# Patient Record
Sex: FEMALE | Race: WHITE | NOT HISPANIC OR LATINO | Employment: OTHER | URBAN - NONMETROPOLITAN AREA
[De-identification: names, ages, dates, MRNs, and addresses within clinical notes are randomized per-mention and may not be internally consistent; named-entity substitution may affect disease eponyms.]

---

## 2021-06-09 ENCOUNTER — TELEPHONE (OUTPATIENT)
Dept: CARDIOLOGY | Facility: CLINIC | Age: 86
End: 2021-06-09

## 2021-06-09 NOTE — TELEPHONE ENCOUNTER
Received cardiac clearance from [facility].    Patient is scheduled for new pt appointment on 07/01/21. Will address following.

## 2021-06-14 NOTE — TELEPHONE ENCOUNTER
Jay Jay wen/ Dr. Crespo's office asked if we received clearance, I explained that pt is scheduled for appt on 7/1.

## 2021-07-01 ENCOUNTER — OFFICE VISIT (OUTPATIENT)
Dept: CARDIOLOGY | Facility: CLINIC | Age: 86
End: 2021-07-01

## 2021-07-01 VITALS
WEIGHT: 161.2 LBS | SYSTOLIC BLOOD PRESSURE: 119 MMHG | OXYGEN SATURATION: 94 % | BODY MASS INDEX: 28.56 KG/M2 | HEART RATE: 79 BPM | HEIGHT: 63 IN | DIASTOLIC BLOOD PRESSURE: 62 MMHG

## 2021-07-01 DIAGNOSIS — I25.10 CORONARY ARTERY DISEASE INVOLVING NATIVE CORONARY ARTERY OF NATIVE HEART, ANGINA PRESENCE UNSPECIFIED: ICD-10-CM

## 2021-07-01 DIAGNOSIS — Z01.810 PREOP CARDIOVASCULAR EXAM: ICD-10-CM

## 2021-07-01 DIAGNOSIS — R06.02 SOB (SHORTNESS OF BREATH): Primary | ICD-10-CM

## 2021-07-01 DIAGNOSIS — R42 DIZZINESS: ICD-10-CM

## 2021-07-01 DIAGNOSIS — R00.2 PALPITATIONS: ICD-10-CM

## 2021-07-01 PROCEDURE — 93000 ELECTROCARDIOGRAM COMPLETE: CPT | Performed by: PHYSICIAN ASSISTANT

## 2021-07-01 PROCEDURE — 99204 OFFICE O/P NEW MOD 45 MIN: CPT | Performed by: PHYSICIAN ASSISTANT

## 2021-07-01 RX ORDER — ALLOPURINOL 300 MG/1
300 TABLET ORAL DAILY
COMMUNITY

## 2021-07-01 RX ORDER — ASPIRIN 81 MG/1
81 TABLET ORAL DAILY
COMMUNITY

## 2021-07-01 RX ORDER — CARVEDILOL 12.5 MG/1
12.5 TABLET ORAL 2 TIMES DAILY WITH MEALS
COMMUNITY
End: 2022-10-27

## 2021-07-01 RX ORDER — TRAMADOL HYDROCHLORIDE 50 MG/1
50 TABLET ORAL EVERY 6 HOURS PRN
COMMUNITY
End: 2022-10-27

## 2021-07-01 NOTE — PATIENT INSTRUCTIONS

## 2021-07-01 NOTE — PROGRESS NOTES
Subjective   Rukhsana Guerrero is a 87 y.o. female     Chief Complaint   Patient presents with   • [facility]     CC for TKA    Problem list:  1.  Coronary artery disease.  1.1.  Multivessel coronary artery disease per catheterization in 2006.  No targets for intervention was noted at that time and medical management was recommended.  She has been treated medically since.  2.  Reported history of anterior wall MI.  3.  History of ischemic cardiomyopathy, estimated EF at 40% by previous work-up.  4.  Moderate mitral regurgitation.  5.  Hypertension  6.  Dyslipidemia    HPI  The patient presents into the clinic today to reestablish care.  She was last seen in 2014.  She has been followed from cardiovascular standpoint given history as above.  By previous catheterization, recommendation was made to treat her medically as there is no target for intervention at that time.  Currently, the patient feels that she is doing reasonably well clinically.  She does have dyspnea which can limit activity.  Most of her activity however is limited secondary to degenerative joint disease.  She denies chest pain.  She has no failure symptoms noted.  She has rare palpitations but no sustained dysrhythmic activity.  She has stable lower extremity edema.  She was referred back to the clinic today by her primary care provider, Dr. Josue.  She was advised that she would need cardiac clearance prior to knee replacement.  The patient has no further complaints otherwise at this time.      Current Outpatient Medications   Medication Sig Dispense Refill   • allopurinol (ZYLOPRIM) 300 MG tablet Take 300 mg by mouth Daily.     • aspirin 81 MG EC tablet Take 81 mg by mouth Daily.     • carvedilol (COREG) 12.5 MG tablet Take 12.5 mg by mouth 2 (Two) Times a Day With Meals.     • traMADol (ULTRAM) 50 MG tablet Take 50 mg by mouth Every 6 (Six) Hours As Needed for Moderate Pain .       No current facility-administered medications for this visit.  "      Codeine    Past Medical History:   Diagnosis Date   • COVID-19    • COVID-19 vaccine administered    • Gout    • Hypertension    • Pneumonia        Social History     Socioeconomic History   • Marital status:      Spouse name: Not on file   • Number of children: Not on file   • Years of education: Not on file   • Highest education level: Not on file   Tobacco Use   • Smoking status: Never Smoker   • Smokeless tobacco: Never Used   Substance and Sexual Activity   • Alcohol use: Never   • Drug use: Never   • Sexual activity: Defer       Family History   Problem Relation Age of Onset   • Pancreatitis Mother    • Heart disease Father    • Heart attack Father        Review of Systems   Constitutional: Negative.  Negative for chills, fatigue and fever.   HENT: Positive for rhinorrhea. Negative for congestion and sore throat.    Eyes: Positive for visual disturbance (glasses).   Respiratory: Negative.  Negative for chest tightness, shortness of breath and wheezing.    Cardiovascular: Positive for leg swelling. Negative for chest pain and palpitations.   Gastrointestinal: Negative.    Endocrine: Negative.    Genitourinary: Negative.    Musculoskeletal: Positive for arthralgias. Negative for back pain and neck pain.   Skin: Negative.  Negative for rash and wound.   Allergic/Immunologic: Positive for environmental allergies.   Neurological: Positive for numbness (arms ). Negative for dizziness, weakness and headaches.   Hematological: Bruises/bleeds easily (bruises/ bleeds).   Psychiatric/Behavioral: Negative.  Negative for sleep disturbance.       Objective     Vitals:    07/01/21 1049   BP: 119/62   BP Location: Left arm   Patient Position: Sitting   Pulse: 79   SpO2: 94%   Weight: 73.1 kg (161 lb 3.2 oz)   Height: 160 cm (63\")        /62 (BP Location: Left arm, Patient Position: Sitting)   Pulse 79   Ht 160 cm (63\")   Wt 73.1 kg (161 lb 3.2 oz)   SpO2 94%   BMI 28.56 kg/m²      Lab Results (most " recent)     None          Physical Exam  Vitals and nursing note reviewed.   Constitutional:       General: She is not in acute distress.     Appearance: She is well-developed.   HENT:      Head: Normocephalic and atraumatic.   Eyes:      Conjunctiva/sclera: Conjunctivae normal.      Pupils: Pupils are equal, round, and reactive to light.   Neck:      Vascular: No JVD.      Trachea: No tracheal deviation.   Cardiovascular:      Rate and Rhythm: Normal rate and regular rhythm.      Heart sounds: Murmur heard.   Systolic (LSB) murmur is present with a grade of 1/6.     Pulmonary:      Effort: Pulmonary effort is normal.      Breath sounds: Normal breath sounds.   Abdominal:      General: Bowel sounds are normal. There is no distension.      Palpations: Abdomen is soft. There is no mass.      Tenderness: There is no abdominal tenderness. There is no guarding or rebound.   Musculoskeletal:         General: No tenderness or deformity. Normal range of motion.      Cervical back: Normal range of motion and neck supple.      Right lower le+ Edema present.      Left lower le+ Edema present.   Skin:     General: Skin is warm and dry.      Coloration: Skin is not pale.      Findings: No erythema or rash.   Neurological:      Mental Status: She is alert and oriented to person, place, and time.   Psychiatric:         Behavior: Behavior normal.         Thought Content: Thought content normal.         Judgment: Judgment normal.         Procedure     ECG 12 Lead    Date/Time: 2021 10:56 AM  Performed by: Everett Salas PA  Authorized by: Everett Salas PA   Comparison: compared with previous ECG from 2020  Comparison to previous ECG: Sinus rhythm, right bundle branch block morphology, left anterior fascicular block, overall low voltage, no acute changes noted.                   Assessment/Plan      Diagnosis Plan   1. SOB (shortness of breath)  Adult Transthoracic Echo Complete W/ Cont if Necessary Per  Protocol    US Carotid Bilateral    Stress Test With Myocardial Perfusion   2. Palpitations  Adult Transthoracic Echo Complete W/ Cont if Necessary Per Protocol    Stress Test With Myocardial Perfusion   3. Dizziness  US Carotid Bilateral    Stress Test With Myocardial Perfusion   4. Coronary artery disease involving native coronary artery of native heart, angina presence unspecified     5. Preop cardiovascular exam       1.  The patient presents today for cardiac clearance prior to knee replacement.  Given cardiovascular history, all as outlined above, I feel that the patient needs further testing prior to surgical clearance.    2.  She will need ischemia assessment given known coronary disease.  She cannot complete treadmill protocol and will be scheduled for Lexiscan nuclear stress testing.  She needs restratification in the preop setting.    3.  With history of ischemic cardiomyopathy and moderate mitral regurgitation by her previous work-up, remote, the patient will be scheduled for an echocardiogram.  We can evaluate LV size and function, valvular morphologies, and cardiac structure/parameters otherwise.    4.  I would like to schedule the patient for carotid duplex given symptoms as above and clinical scenario otherwise.    5.  I would make no adjustments in medications at this time.  She would need consideration for statin therapy.  We can discuss this with her at follow-up.  We will see her in follow-up of the above studies and recommend her further at that time.          Advance Care Planning   ACP discussion was held with the patient during this visit. Patient does not have an advance directive, declines further assistance.    Electronically signed by:

## 2021-07-09 ENCOUNTER — OUTSIDE FACILITY SERVICE (OUTPATIENT)
Dept: CARDIOLOGY | Facility: CLINIC | Age: 86
End: 2021-07-09

## 2021-07-09 PROCEDURE — 93018 CV STRESS TEST I&R ONLY: CPT | Performed by: INTERNAL MEDICINE

## 2021-07-09 PROCEDURE — 93306 TTE W/DOPPLER COMPLETE: CPT | Performed by: INTERNAL MEDICINE

## 2021-07-12 ENCOUNTER — OFFICE VISIT (OUTPATIENT)
Dept: CARDIOLOGY | Facility: CLINIC | Age: 86
End: 2021-07-12

## 2021-07-12 VITALS
DIASTOLIC BLOOD PRESSURE: 84 MMHG | HEIGHT: 63 IN | HEART RATE: 97 BPM | WEIGHT: 159 LBS | BODY MASS INDEX: 28.17 KG/M2 | SYSTOLIC BLOOD PRESSURE: 152 MMHG | OXYGEN SATURATION: 95 %

## 2021-07-12 DIAGNOSIS — R06.02 SHORTNESS OF BREATH: ICD-10-CM

## 2021-07-12 DIAGNOSIS — E78.2 MIXED HYPERLIPIDEMIA: ICD-10-CM

## 2021-07-12 DIAGNOSIS — I10 ESSENTIAL HYPERTENSION: ICD-10-CM

## 2021-07-12 DIAGNOSIS — I25.5 ISCHEMIC CARDIOMYOPATHY: ICD-10-CM

## 2021-07-12 DIAGNOSIS — I25.84 CORONARY ARTERY DISEASE DUE TO CALCIFIED CORONARY LESION: Primary | ICD-10-CM

## 2021-07-12 DIAGNOSIS — I25.10 CORONARY ARTERY DISEASE DUE TO CALCIFIED CORONARY LESION: Primary | ICD-10-CM

## 2021-07-12 PROCEDURE — 99213 OFFICE O/P EST LOW 20 MIN: CPT | Performed by: INTERNAL MEDICINE

## 2021-07-12 NOTE — PROGRESS NOTES
Subjective   Rukhsana Guerrero is a 87 y.o. female     Chief Complaint   Patient presents with   • Follow-up     Here for abnl. stress test   • Coronary Artery Disease   • Hyperlipidemia   • Hypertension   • Shortness of Breath       PROBLEM LIST:     1.  Coronary artery disease.  1.1.  Multivessel coronary artery disease per catheterization in 2006.  No targets for intervention was noted at that time and medical management was recommended.  She has been treated medically since.  1.2 Stress test, 7-9-2021, large apical infarct. Mild saravanan-infarct ischemia to inferior wall, EF 35%  2.  Reported history of anterior wall MI.  3.  History of ischemic cardiomyopathy, estimated EF at 40% by previous work-up.  3.1 Echo, 7-9-2021, EF 35%, DD, mild-mod. PHTN, mod. MR, Mod. TR, pulm. Pressures 55 mmHg  4.  Moderate mitral regurgitation.  5.  Hypertension  6.  Dyslipidemia    Specialty Problems     None            HPI:  Ms. Guerrero returns for follow-up on testing.    She continues to deny chest pain, orthopnea, PND, or change in mild lower extremity edema.  She has no palpitations, dizziness, presyncope, or syncope.  She denies any symptoms of TIA or stroke.    Bilateral carotid duplex study demonstrated no evidence of obstructive disease with antegrade flow in both vertebral arteries.  Echocardiogram demonstrated LV dilation with a global ejection fraction estimated at 35% there was evidence of anterior wall MI and diastolic dysfunction.  I cannot determine the degree of diastolic dysfunction from the printed report.    Stress test demonstrated a large apical infarct with mild saravanan-infarct ischemia limited to the inferoapical segment.  Global ejection fraction was 35%.  PA systolic pressures were estimated in the mid 50s.                    PRIOR MEDICATIONS    Current Outpatient Medications on File Prior to Visit   Medication Sig Dispense Refill   • allopurinol (ZYLOPRIM) 300 MG tablet Take 300 mg by mouth Daily.     • aspirin 81  MG EC tablet Take 81 mg by mouth Daily.     • carvedilol (COREG) 12.5 MG tablet Take 12.5 mg by mouth 2 (Two) Times a Day With Meals.     • traMADol (ULTRAM) 50 MG tablet Take 50 mg by mouth Every 6 (Six) Hours As Needed for Moderate Pain .       No current facility-administered medications on file prior to visit.       ALLERGIES:    Codeine    PAST MEDICAL HISTORY:    Past Medical History:   Diagnosis Date   • COVID-19    • COVID-19 vaccine administered    • Gout    • Hypertension    • Pneumonia        SURGICAL HISTORY:    Past Surgical History:   Procedure Laterality Date   • APPENDECTOMY     • GALLBLADDER SURGERY     • HYSTERECTOMY     • SHOULDER SURGERY         SOCIAL HISTORY:    Social History     Socioeconomic History   • Marital status:      Spouse name: Not on file   • Number of children: Not on file   • Years of education: Not on file   • Highest education level: Not on file   Tobacco Use   • Smoking status: Never Smoker   • Smokeless tobacco: Never Used   Substance and Sexual Activity   • Alcohol use: Never   • Drug use: Never   • Sexual activity: Defer       FAMILY HISTORY:    Family History   Problem Relation Age of Onset   • Pancreatitis Mother    • Heart disease Father    • Heart attack Father        Review of Systems   Constitutional: Positive for fatigue.   HENT: Positive for hearing loss.    Eyes: Positive for visual disturbance (wears glasses).   Respiratory: Positive for shortness of breath.    Cardiovascular: Positive for leg swelling. Negative for chest pain and palpitations.   Gastrointestinal: Negative.    Endocrine: Negative.    Genitourinary: Negative.    Musculoskeletal: Positive for arthralgias, gait problem (ambualtes with 4 point cane) and myalgias.   Skin: Negative.    Allergic/Immunologic: Positive for environmental allergies.   Neurological: Negative for dizziness, syncope and light-headedness.   Hematological: Bruises/bleeds easily.   Psychiatric/Behavioral: Negative.   "      VISIT VITALS:  Vitals:    07/12/21 1018   BP: 152/84   BP Location: Left arm   Patient Position: Sitting   Pulse: 97   SpO2: 95%   Weight: 72.1 kg (159 lb)   Height: 160 cm (62.99\")      /84 (BP Location: Left arm, Patient Position: Sitting)   Pulse 97   Ht 160 cm (62.99\")   Wt 72.1 kg (159 lb)   SpO2 95%   BMI 28.17 kg/m²     RECENT LABS:    Objective       Physical Exam    Procedures      Assessment/Plan   #1.  Ischemic dilated cardiomyopathy.  The patient is at moderate but not prohibitive perioperative risk based on the degree of LV dysfunction present as well as moderately elevated pulmonary pressures.  However, there was no description of hemodynamically significant valve disease on echo and stress test demonstrated only a small amount of saravanan-infarct ischemia.  Both studies were consistent with an ejection fraction of 35%.  Therefore, we will clear the patient for surgery from my cardiac perspective.  In 2006 she had nonoperable anatomy, she has no severe valve disease and no critical ischemia, and although LV systolic function is significantly depressed the patient continues to operate a class II 3 levels of activity despite her orthopedic issues.  Additionally, from a cardiac risk perspective, her surgery is not high risk.    2.  Once the patient is recovered from surgery have asked her to return to our office so we can maximize therapy directed to cardiomyopathy.    3.  Other problems, as listed above, will be addressed at that time or on a as needed basis prior to that as discussed.   Diagnosis Plan   1. Coronary artery disease due to calcified coronary lesion     2. Essential hypertension     3. Ischemic cardiomyopathy     4. Mixed hyperlipidemia     5. Shortness of breath         No follow-ups on file.         Rukhsana Guerrero  reports that she has never smoked. She has never used smokeless tobacco.. I have educated her on the risk of diseases from using tobacco products such as cancer, " COPD and heart disease.     ACP discussion was held with the patient during this visit. Patient does not have an advance directive, declines further assistance.           Patient's Body mass index is 28.17 kg/m². indicating that she is overweight (BMI 25-29.9). Obesity-related health conditions include the following: hypertension, coronary heart disease and dyslipidemias. Obesity is to be assessed at today's visit. BMI is pcp addressing. We discussed portion control and increasing exercise..       Antonette Duran LPN    Scribed for Dr. Simon Veronica by Antonette Duran LPN July 12, 2021 10:41 EDT         Electronically signed by:            This note is dictated utilizing voice recognition software.  Although this record has been proof read, transcriptional errors may still be present. If questions occur regarding the content of this record please do not hesitate to call our office.

## 2021-07-12 NOTE — PATIENT INSTRUCTIONS
Obesity, Adult  Obesity is the condition of having too much total body fat. Being overweight or obese means that your weight is greater than what is considered healthy for your body size. Obesity is determined by a measurement called BMI. BMI is an estimate of body fat and is calculated from height and weight. For adults, a BMI of 30 or higher is considered obese.  Obesity can lead to other health concerns and major illnesses, including:  · Stroke.  · Coronary artery disease (CAD).  · Type 2 diabetes.  · Some types of cancer, including cancers of the colon, breast, uterus, and gallbladder.  · Osteoarthritis.  · High blood pressure (hypertension).  · High cholesterol.  · Sleep apnea.  · Gallbladder stones.  · Infertility problems.  What are the causes?  Common causes of this condition include:  · Eating daily meals that are high in calories, sugar, and fat.  · Being born with genes that may make you more likely to become obese.  · Having a medical condition that causes obesity, including:  ? Hypothyroidism.  ? Polycystic ovarian syndrome (PCOS).  ? Binge-eating disorder.  ? Cushing syndrome.  · Taking certain medicines, such as steroids, antidepressants, and seizure medicines.  · Not being physically active (sedentary lifestyle).  · Not getting enough sleep.  · Drinking high amounts of sugar-sweetened beverages, such as soft drinks.  What increases the risk?  The following factors may make you more likely to develop this condition:  · Having a family history of obesity.  · Being a woman of  descent.  · Being a man of  descent.  · Living in an area with limited access to:  ? [place], recreation centers, or sidewalks.  ? Healthy food choices, such as grocery stores and farmers' markets.  What are the signs or symptoms?  The main sign of this condition is having too much body fat.  How is this diagnosed?  This condition is diagnosed based on:  · Your BMI. If you are an adult with a BMI of 30 or  higher, you are considered obese.  · Your waist circumference. This measures the distance around your waistline.  · Your skinfold thickness. Your health care provider may gently pinch a fold of your skin and measure it.  You may have other tests to check for underlying conditions.  How is this treated?  Treatment for this condition often includes changing your lifestyle. Treatment may include some or all of the following:  · Dietary changes. This may include developing a healthy meal plan.  · Regular physical activity. This may include activity that causes your heart to beat faster (aerobic exercise) and strength training. Work with your health care provider to design an exercise program that works for you.  · Medicine to help you lose weight if you are unable to lose 1 pound a week after 6 weeks of healthy eating and more physical activity.  · Treating conditions that cause the obesity (underlying conditions).  · Surgery. Surgical options may include gastric banding and gastric bypass. Surgery may be done if:  ? Other treatments have not helped to improve your condition.  ? You have a BMI of 40 or higher.  ? You have life-threatening health problems related to obesity.  Follow these instructions at home:  Eating and drinking    · Follow recommendations from your health care provider about what you eat and drink. Your health care provider may advise you to:  ? Limit fast food, sweets, and processed snack foods.  ? Choose low-fat options, such as low-fat milk instead of whole milk.  ? Eat 5 or more servings of fruits or vegetables every day.  ? Eat at home more often. This gives you more control over what you eat.  ? Choose healthy foods when you eat out.  ? Learn to read food labels. This will help you understand how much food is considered 1 serving.  ? Learn what a healthy serving size is.  ? Keep low-fat snacks available.  ? Limit sugary drinks, such as soda, fruit juice, sweetened iced tea, and flavored  milk.  · Drink enough water to keep your urine pale yellow.  · Do not follow a fad diet. Fad diets can be unhealthy and even dangerous.  Physical activity  · Exercise regularly, as told by your health care provider.  ? Most adults should get up to 150 minutes of moderate-intensity exercise every week.  ? Ask your health care provider what types of exercise are safe for you and how often you should exercise.  · Warm up and stretch before being active.  · Cool down and stretch after being active.  · Rest between periods of activity.  Lifestyle  · Work with your health care provider and a dietitian to set a weight-loss goal that is healthy and reasonable for you.  · Limit your screen time.  · Find ways to reward yourself that do not involve food.  · Do not drink alcohol if:  ? Your health care provider tells you not to drink.  ? You are pregnant, may be pregnant, or are planning to become pregnant.  · If you drink alcohol:  ? Limit how much you use to:  § 0-1 drink a day for women.  § 0-2 drinks a day for men.  ? Be aware of how much alcohol is in your drink. In the [place] one drink equals one 12 oz bottle of beer (355 mL), one 5 oz glass of wine (148 mL), or one 1½ oz glass of hard liquor (44 mL).  General instructions  · Keep a weight-loss journal to keep track of the food you eat and how much exercise you get.  · Take over-the-counter and prescription medicines only as told by your health care provider.  · Take vitamins and supplements only as told by your health care provider.  · Consider joining a support group. Your health care provider may be able to recommend a support group.  · Keep all follow-up visits as told by your health care provider. This is important.  Contact a health care provider if:  · You are unable to meet your weight loss goal after 6 weeks of dietary and lifestyle changes.  Get help right away if you are having:  · Trouble breathing.  · Suicidal thoughts or behaviors.  Summary  · Obesity is the  condition of having too much total body fat.  · Being overweight or obese means that your weight is greater than what is considered healthy for your body size.  · Work with your health care provider and a dietitian to set a weight-loss goal that is healthy and reasonable for you.  · Exercise regularly, as told by your health care provider. Ask your health care provider what types of exercise are safe for you and how often you should exercise.  This information is not intended to replace advice given to you by your health care provider. Make sure you discuss any questions you have with your health care provider.  Document Revised: 08/22/2019 Document Reviewed: 08/22/2019  "[employer] Patient Education © 2021 "[employer] Inc.  MyPlate from [employer]    MyPlate is an outline of a general healthy diet based on the 2010 Dietary Guidelines for Americans, from the [place]. Department of Agriculture (USDA). It sets guidelines for how much food you should eat from each food group based on your age, sex, and level of physical activity.  What are tips for following MyPlate?  To follow MyPlate recommendations:  · Eat a wide variety of fruits and vegetables, grains, and protein foods.  · Serve smaller portions and eat less food throughout the day.  · Limit portion sizes to avoid overeating.  · Enjoy your food.  · Get at least 150 minutes of exercise every week. This is about 30 minutes each day, 5 or more days per week.  It can be difficult to have every meal look like MyPlate. Think about MyPlate as eating guidelines for an entire day, rather than each individual meal.  Fruits and vegetables  · Make half of your plate fruits and vegetables.  · Eat many different colors of fruits and vegetables each day.  · For a 2,000 calorie daily food plan, eat:  ? 2½ cups of vegetables every day.  ? 2 cups of fruit every day.  · 1 cup is equal to:  ? 1 cup raw or cooked vegetables.  ? 1 cup raw fruit.  ? 1 medium-sized orange, apple, or banana.  ? 1 cup  100% fruit or vegetable juice.  ? 2 cups raw leafy greens, such as lettuce, spinach, or kale.  ? ½ cup dried fruit.  Grains  · One fourth of your plate should be grains.  · Make at least half of the grains you eat each day whole grains.  · For a 2,000 calorie daily food plan, eat 6 oz of grains every day.  · 1 oz is equal to:  ? 1 slice bread.  ? 1 cup cereal.  ? ½ cup cooked rice, cereal, or pasta.  Protein  · One fourth of your plate should be protein.  · Eat a wide variety of protein foods, including meat, poultry, fish, eggs, beans, nuts, and tofu.  · For a 2,000 calorie daily food plan, eat 5½ oz of protein every day.  · 1 oz is equal to:  ? 1 oz meat, poultry, or fish.  ? ¼ cup cooked beans.  ? 1 egg.  ? ½ oz nuts or seeds.  ? 1 Tbsp peanut butter.  Dairy  · Drink fat-free or low-fat (1%) milk.  · Eat or drink dairy as a side to meals.  · For a 2,000 calorie daily food plan, eat or drink 3 cups of dairy every day.  · 1 cup is equal to:  ? 1 cup milk, yogurt, cottage cheese, or soy milk (soy beverage).  ? 2 oz processed cheese.  ? 1½ oz natural cheese.  Fats, oils, salt, and sugars  · Only small amounts of oils are recommended.  · Avoid foods that are high in calories and low in nutritional value (empty calories), like foods high in fat or added sugars.  · Choose foods that are low in salt (sodium). Choose foods that have less than 140 milligrams (mg) of sodium per serving.  · Drink water instead of sugary drinks. Drink enough water each day to keep your urine pale yellow.  Where to find support  · Work with your health care provider or a nutrition specialist (dietitian) to develop a customized eating plan that is right for you.  · Download an santos (mobile application) to help you track your daily food intake.  Where to find more information  · Go to ChooseMyPlate.gov for more information.  Summary  · MyPlate is a general guideline for healthy eating from the USDA. It is based on the 2010 Dietary Guidelines for  Americans.  · In general, fruits and vegetables should take up ½ of your plate, grains should take up ¼ of your plate, and protein should take up ¼ of your plate.  This information is not intended to replace advice given to you by your health care provider. Make sure you discuss any questions you have with your health care provider.  Document Revised: 05/21/2020 Document Reviewed: 03/19/2018  [employer] Patient Education © 2021 [employer].

## 2022-10-20 ENCOUNTER — TELEPHONE (OUTPATIENT)
Dept: CARDIOLOGY | Facility: CLINIC | Age: 87
End: 2022-10-20

## 2022-10-20 NOTE — TELEPHONE ENCOUNTER
Caller: NATHALIE ERICKSON    Relationship: Emergency Contact    Best call back number: [phone]    What is the best time to reach you: ANYTIME     Who are you requesting to speak with (clinical staff, provider,  specific staff member): ANYONE     What was the call regarding: PATIENT WAS HOSPITALIZED AT [facility]. WAS TOLD TO FOLLOW UP WITH CARDIOLOGIST IN 2 WEEKS. NO AVAILABILITY TILL January. PLEASE ADVISE ON SOONER APPT.     Do you require a callback: YES

## 2022-10-27 ENCOUNTER — OFFICE VISIT (OUTPATIENT)
Dept: CARDIOLOGY | Facility: CLINIC | Age: 87
End: 2022-10-27

## 2022-10-27 ENCOUNTER — TELEPHONE (OUTPATIENT)
Dept: CARDIOLOGY | Facility: CLINIC | Age: 87
End: 2022-10-27

## 2022-10-27 VITALS
BODY MASS INDEX: 28.71 KG/M2 | WEIGHT: 156 LBS | DIASTOLIC BLOOD PRESSURE: 88 MMHG | SYSTOLIC BLOOD PRESSURE: 139 MMHG | HEART RATE: 69 BPM | HEIGHT: 62 IN | OXYGEN SATURATION: 95 %

## 2022-10-27 DIAGNOSIS — R00.2 PALPITATIONS: ICD-10-CM

## 2022-10-27 DIAGNOSIS — I25.10 CORONARY ARTERY DISEASE DUE TO CALCIFIED CORONARY LESION: ICD-10-CM

## 2022-10-27 DIAGNOSIS — I48.91 ATRIAL FIBRILLATION, UNSPECIFIED TYPE: ICD-10-CM

## 2022-10-27 DIAGNOSIS — I25.5 ISCHEMIC CARDIOMYOPATHY: ICD-10-CM

## 2022-10-27 DIAGNOSIS — R06.02 SHORTNESS OF BREATH: Primary | ICD-10-CM

## 2022-10-27 DIAGNOSIS — I25.84 CORONARY ARTERY DISEASE DUE TO CALCIFIED CORONARY LESION: ICD-10-CM

## 2022-10-27 PROCEDURE — 99214 OFFICE O/P EST MOD 30 MIN: CPT | Performed by: PHYSICIAN ASSISTANT

## 2022-10-27 RX ORDER — METOPROLOL TARTRATE 75 MG/1
75 TABLET, FILM COATED ORAL 2 TIMES DAILY
COMMUNITY
End: 2022-11-17 | Stop reason: DRUGHIGH

## 2022-10-27 RX ORDER — DILTIAZEM HYDROCHLORIDE 180 MG/1
180 CAPSULE, COATED, EXTENDED RELEASE ORAL DAILY
COMMUNITY
End: 2022-10-27

## 2022-10-27 RX ORDER — ATORVASTATIN CALCIUM 40 MG/1
40 TABLET, FILM COATED ORAL DAILY
COMMUNITY

## 2022-10-27 RX ORDER — DIGOXIN 125 MCG
62.5 TABLET ORAL
COMMUNITY
End: 2023-01-19 | Stop reason: SDUPTHER

## 2022-10-27 NOTE — TELEPHONE ENCOUNTER
Patient received Eliquis 2.5 mg   Lot# [id number]  Expir-06/23  x2 boxes  [facility]

## 2022-10-27 NOTE — PROGRESS NOTES
Problem list     Subjective   Rukhsana Guerrero is a 88 y.o. female     Chief Complaint   Patient presents with   • Hospital Follow Up Visit     Chest pain,SOB 10/16/22   PROBLEM LIST:      1.  Coronary artery disease.  1.1.  Multivessel coronary artery disease per catheterization in 2006.  No targets for intervention was noted at that time and medical management was recommended.  She has been treated medically since.  1.2.  Repeat catheterization during hospitalization for non-ST elevation MI, 10/2022.  The patient again had diffuse coronary disease noted with no targets for intervention.  LAD was occluded mid vessel with no described collateral flow.  PDA was at 70% with otherwise noted moderate diffuse disease.  Medical management was recommended.  2.  Reported history of anterior wall MI.  3.  History of ischemic cardiomyopathy, estimated EF at 40% by previous work-up.  3.1.  Echocardiogram during hospitalization, 10/2022, again supports evidence of systolic dysfunction but with an EF of 45±5%.  4.  Atrial fibrillation.  5.  Hypertension  6.  Dyslipidemia    HPI  The patient presents in the [facility] the clinic today for hospital follow-up.  She was lost to follow-up through this clinic for unknown reasons.  She had done well up until just over a week ago.  She started noticing onset of palpitations, weakness, dyspnea, and even dizziness at times.  This persisted for several days and she eventually consented to travel on to the emergency room.  Upon arrival, she was noted to have elevated troponin.  Telemetry supported atrial fibrillation with rapid ventricular response rates.  She was started on IV medications to address the same.  She eventually was converted over to oral medications.  For rate control now, she takes metoprolol and digoxin.  She was started on diltiazem as well prior to her discharge.  She was started on Coumadin but this was changed to Eliquis by her primary care provider.  She presents today for  follow-up on all the above.  Of note, she was eventually taken for catheterization because of elevated troponin and clinical scenario otherwise.  This indicated anatomy as above.  The patient describes no chest pain.  Dyspnea and fatigue continue to be monitored.  She denies failure symptoms.  Only rarely does she now since episodes of A. fib.    Current Outpatient Medications on File Prior to Visit   Medication Sig Dispense Refill   • allopurinol (ZYLOPRIM) 300 MG tablet Take 300 mg by mouth Daily.     • apixaban (ELIQUIS) 2.5 MG tablet tablet Take 1 tablet by mouth 2 (Two) Times a Day.     • aspirin 81 MG EC tablet Take 81 mg by mouth Daily.     • atorvastatin (LIPITOR) 40 MG tablet Take 1 tablet by mouth Daily.     • digoxin (LANOXIN) 125 MCG tablet Take 1 tablet by mouth Daily.     • Metoprolol Tartrate 75 MG tablet Take 75 mg by mouth 2 (Two) Times a Day.     • [DISCONTINUED] dilTIAZem CD (CARDIZEM CD) 180 MG 24 hr capsule Take 1 capsule by mouth Daily.     • [DISCONTINUED] carvedilol (COREG) 12.5 MG tablet Take 12.5 mg by mouth 2 (Two) Times a Day With Meals.     • [DISCONTINUED] traMADol (ULTRAM) 50 MG tablet Take 50 mg by mouth Every 6 (Six) Hours As Needed for Moderate Pain .       No current facility-administered medications on file prior to visit.       Codeine    Past Medical History:   Diagnosis Date   • COVID-19    • COVID-19 vaccine administered    • Gout    • History of atrial fibrillation    • Hypertension    • Pneumonia        Social History     Socioeconomic History   • Marital status:    Tobacco Use   • Smoking status: Never   • Smokeless tobacco: Never   Substance and Sexual Activity   • Alcohol use: Never   • Drug use: Never   • Sexual activity: Defer       Family History   Problem Relation Age of Onset   • Pancreatitis Mother    • Heart disease Father    • Heart attack Father        Review of Systems   Constitutional: Negative for activity change, chills, fatigue and fever.   HENT:  "Negative.    Eyes: Negative.    Respiratory: Positive for shortness of breath.    Cardiovascular: Positive for chest pain.   Gastrointestinal: Negative for blood in stool.   Endocrine: Negative.    Genitourinary: Negative.  Negative for hematuria.   Musculoskeletal: Positive for arthralgias, back pain, gait problem and joint swelling (kevan hands). Negative for neck pain and neck stiffness.   Skin: Negative for color change, rash and wound.        Bruising on arms due to IV While in hospital     Neurological: Positive for weakness (Kevan easily). Negative for dizziness, syncope, light-headedness, numbness and headaches.   Hematological: Bruises/bleeds easily.   Psychiatric/Behavioral: Negative.  Negative for sleep disturbance.       Objective   Vitals:    10/27/22 1601   BP: 139/88   BP Location: Left arm   Patient Position: Sitting   Pulse: 69   SpO2: 95%   Weight: 70.8 kg (156 lb)   Height: 157.5 cm (62\")      /88 (BP Location: Left arm, Patient Position: Sitting)   Pulse 69   Ht 157.5 cm (62\")   Wt 70.8 kg (156 lb)   SpO2 95%   BMI 28.53 kg/m²    Lab Results (most recent)     None        Physical Exam  Vitals and nursing note reviewed.   Constitutional:       General: She is not in acute distress.     Appearance: She is well-developed.   HENT:      Head: Normocephalic and atraumatic.   Eyes:      Conjunctiva/sclera: Conjunctivae normal.      Pupils: Pupils are equal, round, and reactive to light.   Neck:      Vascular: No JVD.      Trachea: No tracheal deviation.   Cardiovascular:      Rate and Rhythm: Normal rate. Rhythm irregular.      Heart sounds: Normal heart sounds.   Pulmonary:      Effort: Pulmonary effort is normal.      Breath sounds: Normal breath sounds.   Abdominal:      General: Bowel sounds are normal. There is no distension.      Palpations: Abdomen is soft. There is no mass.      Tenderness: There is no abdominal tenderness. There is no guarding or rebound.   Musculoskeletal:         " General: No tenderness or deformity. Normal range of motion.      Cervical back: Normal range of motion and neck supple.      Right lower le+ Edema present.      Left lower le+ Edema present.   Skin:     General: Skin is warm and dry.      Coloration: Skin is not pale.      Findings: No erythema or rash.   Neurological:      Mental Status: She is alert and oriented to person, place, and time.   Psychiatric:         Behavior: Behavior normal.         Thought Content: Thought content normal.         Judgment: Judgment normal.           Procedure   Procedures       Assessment & Plan      Diagnosis Plan   1. Shortness of breath  Cardiac Event Monitor    Basic Metabolic Panel      2. Palpitations  Cardiac Event Monitor    Basic Metabolic Panel      3. Atrial fibrillation, unspecified type (HCC)  Cardiac Event Monitor    Basic Metabolic Panel      4. Ischemic cardiomyopathy        5. Coronary artery disease due to calcified coronary lesion          1.  The patient is in for follow-up status post recent hospitalization.  She was hospitalized for new onset A. fib.  She has now been placed on rate control medication and anticoagulation.  EF appears mostly stable from prior evaluation through the clinic at 45±5%.  She has diffuse coronary artery disease by catheterization but with no targets for intervention.  Medical management will be continued in terms of treatment of the same.    2.  I am concerned with the diltiazem and her ischemic cardiomyopathy.  I would be concerned about long-term complications/failure related to the same.  She will discontinue diltiazem.  She will continue metoprolol and digoxin.  We can increase metoprolol dosing in the future if needed for intensified rate control.    3.  I will schedule for an event monitor so that we may follow A. fib burden as well as ventricular response rates.  We can adjust medications accordingly.    4.  Because of her dilated cardiomyopathy, I would schedule for  the patient to start Entresto.  We will start her on 24/26 mg 1 p.o. twice daily.  She will need a BMP in 2 weeks given institution of that medication.  She will monitor blood pressure and heart rates closely with all the above medication changes.    5.  We did give her samples of Eliquis.  She will continue that at 2.5 mg 1 p.o. twice daily.    6.  She will continue medical regimen otherwise.  I would like to see her back in 1 to 2 months and reevaluate overall clinical course and response to change in medications.  She will call for any complications prior to follow-up.  We may eventually consider SGLT2 inhibitor therapy.  I would like to make the above medication changes first and can adjust further in the future if needed.      Advance Care Planning   ACP discussion was declined by the patient. Patient does not have an advance directive, declines further assistance.             Electronically signed by:

## 2022-11-02 ENCOUNTER — TELEPHONE (OUTPATIENT)
Dept: CARDIOLOGY | Facility: CLINIC | Age: 87
End: 2022-11-02

## 2022-11-02 NOTE — TELEPHONE ENCOUNTER
----- Message from Shell Mckinley sent at 11/2/2022  5:34 PM EDT -----  I called [employer] and they have verified the address and will mail out the monitor in 3-5 business day.  I tired to contact pt and left vm letting pt know they will be mailing out monitor.  ----- Message -----  From: Мария Govea MA  Sent: 11/2/2022   3:49 PM EDT  To: Shell Mckinley    Patient has not received her monitor. She was told she would receive it Monday. Is there a way to check shipping status? Her daughter Gwendolyn left message and can be reached at [phone].

## 2022-11-14 ENCOUNTER — TELEPHONE (OUTPATIENT)
Dept: CARDIOLOGY | Facility: CLINIC | Age: 87
End: 2022-11-14

## 2022-11-14 DIAGNOSIS — I49.5 TACHY-BRADY SYNDROME: ICD-10-CM

## 2022-11-14 DIAGNOSIS — I45.5 SINUS PAUSE: ICD-10-CM

## 2022-11-14 DIAGNOSIS — R06.02 SHORTNESS OF BREATH: Primary | ICD-10-CM

## 2022-11-14 NOTE — TELEPHONE ENCOUNTER
Critical monitor reports received for afib and 6 second pause with HR at 20 bpm at 5:11 this morning. Patient reports doing well now but having trouble sleeping. 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 Мария Govea MA

## 2022-11-16 ENCOUNTER — TELEPHONE (OUTPATIENT)
Dept: CARDIOLOGY | Facility: CLINIC | Age: 87
End: 2022-11-16

## 2022-11-16 NOTE — TELEPHONE ENCOUNTER
Hub to read    Cardiac Report Day 3-Critical   Atrial fibrillation,tachy      Per Everett Salas,PAC Verbal  Please order a echo, CMP TSH,Mag for patient.    Tried to contact patient no answer left vm to return call. Tried to also contact daughter no answer left vm to return call.

## 2022-11-16 NOTE — TELEPHONE ENCOUNTER
Spoke with patient and her daughter, states she feels fine and has no concerns at this time. Had echo/LABS done 10/16/22 labs at [facility] while in er with chest pain. I advised would notify dov.

## 2022-11-17 ENCOUNTER — TELEPHONE (OUTPATIENT)
Dept: CARDIOLOGY | Facility: CLINIC | Age: 87
End: 2022-11-17

## 2022-11-17 RX ORDER — METOPROLOL TARTRATE 100 MG/1
100 TABLET ORAL 2 TIMES DAILY
Qty: 60 TABLET | Refills: 5 | Status: SHIPPED | OUTPATIENT
Start: 2022-11-17 | End: 2023-01-19 | Stop reason: SDUPTHER

## 2022-11-17 NOTE — PROGRESS NOTES
Cardiac Electrophysiology Outpatient Note  [facility] at [facility]    Office Visit     Rukhsana Guerrero  [id number]  11/18/2022    Primary Care Physician: Simon Josue MD    Referred By: Everett Salas PA    CC: Sinus pause    Problem List:  1. Sinus pause  a. Event monitor 11/2022: 6-second pause during sleep hours  2. Atrial fibrillation  a. [id number]=  3. Coronary artery disease  a. Multivessel CAD per cardiac cath in 2006, medical management only  b. Repeat cardiac cath 10/2022 for NSTEMI, diffuse coronary artery disease with no targets for intervention.  LAD was occluded mid vessel with no collateral flow.  PDA was 70%, medical management  4. Ischemic cardiomyopathy  a. Echocardiogram during hospitalization, 10/2022, again supports evidence of systolic dysfunction but with an EF of 45±5%.  5. Hypertension  6. Dyslipidemia    History of Present Illness:   Rukhsana Guerrero is a 88 y.o. female who presents to the electrophysiology clinic for consultation regarding atrial fibrillation and tachybradycardia syndrome, requested by TRISH Hernandez.  Patient has the above-noted past medical history.  Recently wore an event monitor which revealed a 6-second pause occurring during sleep hours as well as episodes of A. fib with RVR.    She was found to have atrial fibrillation rather incidentally was in the hospital.  At that point she said she was overall is not feeling well.  She is found of atrial fibrillation with RVR.  She was started on rate control for this.  She also had a troponin elevation underwent a heart cath showing a chronically occluded LAD.  No intervention was done on this.  Her rate control has been adjusted at home.  She had been on Cardizem but due to mildly reduced ejection fraction this was switched to metoprolol and digoxin.  She is currently undergoing a monitor to look at her burden of atrial fibrillation as well as her degree of rate control.  She is noted to  "have a sick second pause as above while sleeping.    Denies any symptoms currently.  She overall feels well.  She is able to be active and walks around with a cane without any issues.  She has not had any presyncope or syncope.  She does not have any palpitations.  She denies any chest pain, statin, PND, lower extremity swelling.    Past Surgical History:   Procedure Laterality Date   • APPENDECTOMY     • CARDIAC CATHETERIZATION     • GALLBLADDER SURGERY     • HYSTERECTOMY     • SHOULDER SURGERY         Family History   Problem Relation Age of Onset   • Pancreatitis Mother    • Heart disease Father    • Heart attack Father        Social History     Socioeconomic History   • Marital status:    Tobacco Use   • Smoking status: Never   • Smokeless tobacco: Never   Substance and Sexual Activity   • Alcohol use: Never   • Drug use: Never   • Sexual activity: Not Currently     Birth control/protection: Post-menopausal         Current Outpatient Medications:   •  allopurinol (ZYLOPRIM) 300 MG tablet, Take 300 mg by mouth Daily., Disp: , Rfl:   •  apixaban (ELIQUIS) 2.5 MG tablet tablet, Take 1 tablet by mouth 2 (Two) Times a Day., Disp: , Rfl:   •  aspirin 81 MG EC tablet, Take 81 mg by mouth Daily., Disp: , Rfl:   •  atorvastatin (LIPITOR) 40 MG tablet, Take 1 tablet by mouth Daily., Disp: , Rfl:   •  digoxin (LANOXIN) 125 MCG tablet, Take 1 tablet by mouth Daily., Disp: , Rfl:   •  metoprolol tartrate (LOPRESSOR) 100 MG tablet, Take 1 tablet by mouth 2 (Two) Times a Day., Disp: 60 tablet, Rfl: 5  •  sacubitril-valsartan (ENTRESTO) 24-26 MG tablet, Take 1 tablet by mouth 2 (Two) Times a Day., Disp: 90 tablet, Rfl: 3    Allergies:   Allergies   Allergen Reactions   • Codeine GI Intolerance       Review of Systems:  ROS     Vital Signs: Blood pressure 118/70, pulse 68, height 157.5 cm (62\"), weight 70.8 kg (156 lb), SpO2 95 %.    Physical Exam    Lab Results   Component Value Date    CALCIUM 8.8 (L) 03/22/2022    NA " 139 03/22/2022    K 3.6 (L) 03/22/2022    CO2 22 03/22/2022     03/22/2022    BUN 26 (H) 03/22/2022    CREATININE 1.64 (H) 03/22/2022    EGFRIFAFRI 36 (L) 03/22/2022    EGFRIFNONA 30 (L) 03/22/2022    BCR 16 03/22/2022    ANIONGAP 16 03/22/2022     Lab Results   Component Value Date    WBC 7.08 03/22/2022    HGB 10.6 (L) 03/22/2022    HCT 32.3 (L) 03/22/2022    MCV 97 03/22/2022     (L) 03/22/2022     Lab Results   Component Value Date    INR 1.0 03/21/2022     No results found for: TSH, G0MUWTB, C0YDPMF, THYROIDAB         I personally viewed and interpreted the patient's EKG/Telemetry/lab data.              ECG 12 Lead    Date/Time: 11/18/2022 12:40 PM  Performed by: Nicolas Portillo MD  Authorized by: Nicolas Portillo MD   Comparison: compared with previous ECG from 7/1/2021  Comparison to previous ECG: Atrial fibrillation has replaced sinus rhythm  Comments: Atrial fibrillation with borderline ventricular rate            Rukhsana Guerrero  reports that she has never smoked. She has never used smokeless tobacco.    Assessment & Plan    1. Atrial fibrillation, unspecified type (HCC)  She has a relatively recent diagnosis of atrial fibrillation.  Her rates were high when she was in the hospital and she is now on rate control for this.  She is currently on metoprolol and digoxin.  She is currently wearing a monitor both to ascertain her burden of atrial fibrillation as well as her degree of rate control.  On her monitor she was found to have a 6-second pause while sleeping.  I personally interpreted the tracings from this.  She does seem to have periods of fast rates and that single pause.  Unfortunately we do not have the full report back on the monitor.    We discussed the pathophysiology of atrial fibrillation and importance of anticoagulation.  We also discussed the importance of rate control.  At the current time I am uncertain what her atrial fibrillation burden is.  I am guessing she is fairly  persistent, however we will have to wait for the final cortical monitor come back.  Would also be helpful to know what her degree of rate control is on her monitor.  As above she did have a single pause of about 6 seconds while sleeping.  We discussed the indication for pacemaker implantation in the setting of atrial fibrillation.  This would mostly be due to symptoms related to bradycardia or pauses.  At the current time, as this episode only occurred during sleep she does not have any symptoms.  I do not think she is at a risk of having any significantly dangerous rhythm, so pacemaker will be reserved for symptoms.  If she is in atrial fibrillation all the time and she does need a pacemaker we discussed that she would likely be a candidate for leadless pacemaker which would be a good option for her.  If she does have tachybradycardia syndrome with periods of sinus bradycardia then she would likely need a dual-chamber pacemaker.  As above, at the current time I do not think there is any urgency for this that she is not having any symptoms.  Will await the final report of her monitor.       Follow Up:  Return in about 6 months (around 5/18/2023).

## 2022-11-17 NOTE — TELEPHONE ENCOUNTER
CARDIAC REPORT:DAY 4-SERIOUS  ATRIAL FIBRILLATION RVR    PER MARTÍN PACHECO VERBAL   INCREASE METOPROLOL 100MG BID. MONITOR SX. CALL BACK WITH ANY CONCERNS.     PATIENTS DAUGHTER AWARE IF ANY SX, OR INCREASED HR AFTER MEDS TO GO TO ER.    Maxine Lopez MA

## 2022-11-18 ENCOUNTER — OFFICE VISIT (OUTPATIENT)
Dept: CARDIOLOGY | Facility: CLINIC | Age: 87
End: 2022-11-18

## 2022-11-18 VITALS
OXYGEN SATURATION: 95 % | BODY MASS INDEX: 28.71 KG/M2 | DIASTOLIC BLOOD PRESSURE: 70 MMHG | SYSTOLIC BLOOD PRESSURE: 118 MMHG | HEIGHT: 62 IN | HEART RATE: 68 BPM | WEIGHT: 156 LBS

## 2022-11-18 DIAGNOSIS — I48.91 ATRIAL FIBRILLATION, UNSPECIFIED TYPE: Primary | ICD-10-CM

## 2022-11-18 PROCEDURE — 99204 OFFICE O/P NEW MOD 45 MIN: CPT | Performed by: STUDENT IN AN ORGANIZED HEALTH CARE EDUCATION/TRAINING PROGRAM

## 2022-11-18 PROCEDURE — 93000 ELECTROCARDIOGRAM COMPLETE: CPT | Performed by: STUDENT IN AN ORGANIZED HEALTH CARE EDUCATION/TRAINING PROGRAM

## 2022-12-05 ENCOUNTER — TELEPHONE (OUTPATIENT)
Dept: CARDIOLOGY | Facility: CLINIC | Age: 87
End: 2022-12-05

## 2022-12-05 NOTE — TELEPHONE ENCOUNTER
PATIENT IS SCHEDULED 12/08/22 TO GO OVER MONITOR RESULTS. STATES SHE IS FEELING FINE, NO CONCERNS AT THIS TIME.

## 2022-12-05 NOTE — TELEPHONE ENCOUNTER
----- Message from TRISH Potter sent at 12/5/2022  9:06 AM EST -----  See me on this.  ----- Message -----  From: Simon Veronica MD  Sent: 12/3/2022   3:42 PM EST  To: TRISH Potter      Mobile Cardiac Outpatient Telemetry  Order: [id number]   Status: Final result      Visible to patient: No (not released)      Dx: Palpitations; Shortness of breath; At...      0 Result Notes  Details    Reading Physician Reading Date Result Priority   Simon Veronica MD  [phone] 12/3/2022 Routine     Result Text  1.  A. fib is the baseline and predominant rhythm throughout the study  2.  Recurrent rapid ventricular response rates are noted, with max heart rate noted at 181 bpm.  3.  Rare PVCs noted.  4.  A 6-second pause is noted and reported at 5:11 AM on 11/14/2022.  5.  Evaluation for sleep apnea is recommended.  6.  Would recommend consideration for EP evaluation given tachybradycardia syndrome.  7.  Follow in the clinic as soon as possible to address all the above.  8.  No significant symptoms are reported.

## 2022-12-08 ENCOUNTER — OFFICE VISIT (OUTPATIENT)
Dept: CARDIOLOGY | Facility: CLINIC | Age: 87
End: 2022-12-08

## 2022-12-08 ENCOUNTER — TELEPHONE (OUTPATIENT)
Dept: CARDIOLOGY | Facility: CLINIC | Age: 87
End: 2022-12-08

## 2022-12-08 VITALS
SYSTOLIC BLOOD PRESSURE: 126 MMHG | HEART RATE: 121 BPM | BODY MASS INDEX: 28.49 KG/M2 | OXYGEN SATURATION: 98 % | DIASTOLIC BLOOD PRESSURE: 79 MMHG | HEIGHT: 62 IN | WEIGHT: 154.8 LBS

## 2022-12-08 DIAGNOSIS — I48.91 ATRIAL FIBRILLATION, UNSPECIFIED TYPE: Primary | ICD-10-CM

## 2022-12-08 DIAGNOSIS — R06.09 DYSPNEA ON EXERTION: ICD-10-CM

## 2022-12-08 DIAGNOSIS — I10 PRIMARY HYPERTENSION: ICD-10-CM

## 2022-12-08 PROCEDURE — 99213 OFFICE O/P EST LOW 20 MIN: CPT | Performed by: PHYSICIAN ASSISTANT

## 2022-12-08 NOTE — PROGRESS NOTES
Problem list     Subjective   Rukhsana Guerrero is a 88 y.o. female     Chief Complaint   Patient presents with   • Follow-up     F/U Monitor   PROBLEM LIST:      1.  Coronary artery disease.  1.1.  Multivessel coronary artery disease per catheterization in 2006.  No targets for intervention was noted at that time and medical management was recommended.  She has been treated medically since.  1.2.  Repeat catheterization during hospitalization for non-ST elevation MI, 10/2022.  The patient again had diffuse coronary disease noted with no targets for intervention.  LAD was occluded mid vessel with no described collateral flow.  PDA was at 70% with otherwise noted moderate diffuse disease.  Medical management was recommended.  2.  Reported history of anterior wall MI.  3.  History of ischemic cardiomyopathy, estimated EF at 40% by previous work-up.  3.1.  Echocardiogram during hospitalization, 10/2022, again supports evidence of systolic dysfunction but with an EF of 45±5%.  4.  Atrial fibrillation.  5.  Hypertension  6.  Dyslipidemia    HPI  The patient presents to the clinic today for follow-up.  She did have an event monitor given atrial fibrillation.  Monitor supported OCTAVIO mota basically throughout the study.  She had evidence of tachybradycardia syndrome to a degree.  She had recurrent rapid ventricular response rates.  She had a 6-second pause which was during sleep hours.  She saw an EP provider in follow-up who felt that ongoing monitoring was indicated.  Her heart rate still averaged between 120-130 at home.  She is symptomatic of this at times.  She otherwise denies chest pain.  Dyspnea and fatigue limiting.  She has no failure symptoms.  She has no further complaints.    Current Outpatient Medications on File Prior to Visit   Medication Sig Dispense Refill   • allopurinol (ZYLOPRIM) 300 MG tablet Take 300 mg by mouth Daily.     • apixaban (ELIQUIS) 2.5 MG tablet tablet Take 1 tablet by mouth 2 (Two) Times a Day.      • aspirin 81 MG EC tablet Take 81 mg by mouth Daily.     • atorvastatin (LIPITOR) 40 MG tablet Take 1 tablet by mouth Daily.     • digoxin (LANOXIN) 125 MCG tablet Take 1 tablet by mouth Daily.     • metoprolol tartrate (LOPRESSOR) 100 MG tablet Take 1 tablet by mouth 2 (Two) Times a Day. (Patient taking differently: Take 75 mg by mouth 2 (Two) Times a Day.) 60 tablet 5   • [DISCONTINUED] sacubitril-valsartan (ENTRESTO) 24-26 MG tablet Take 1 tablet by mouth 2 (Two) Times a Day. 90 tablet 3     No current facility-administered medications on file prior to visit.       Codeine    Past Medical History:   Diagnosis Date   • Abnormal ECG    • Atrial fibrillation (HCC)    • CHF (congestive heart failure) (HCC)    • Congenital heart disease    • Coronary artery disease    • COVID-19    • COVID-19 vaccine administered    • Diabetes mellitus (HCC)    • Gout    • Heart valve disease    • History of atrial fibrillation    • Hyperlipidemia    • Hypertension    • Myocardial infarction (HCC)    • Pneumonia    • Stroke (HCC)        Social History     Socioeconomic History   • Marital status:    Tobacco Use   • Smoking status: Never   • Smokeless tobacco: Never   Vaping Use   • Vaping Use: Never used   Substance and Sexual Activity   • Alcohol use: Never   • Drug use: Never   • Sexual activity: Not Currently     Birth control/protection: Post-menopausal       Family History   Problem Relation Age of Onset   • Pancreatitis Mother    • Heart disease Father    • Heart attack Father        Review of Systems   Constitutional: Positive for fatigue.   HENT: Negative.  Negative for congestion, rhinorrhea and sore throat.    Eyes:        Wears glasses. She has macular degeneration.   Respiratory: Positive for apnea and cough (Pt coughs mostly at night. ). Negative for chest tightness, shortness of breath and wheezing.    Cardiovascular: Positive for palpitations and leg swelling (Left leg swells especially around the ankles).  "Negative for chest pain.   Gastrointestinal: Positive for constipation and diarrhea. Negative for abdominal pain, nausea and vomiting.   Endocrine: Positive for cold intolerance. Negative for heat intolerance.   Genitourinary: Positive for difficulty urinating and urgency. Negative for frequency.   Musculoskeletal: Positive for arthralgias. Negative for back pain and neck pain.   Skin: Negative for rash and wound.   Allergic/Immunologic: Positive for environmental allergies. Negative for food allergies.   Neurological: Negative for dizziness, weakness, light-headedness and numbness.   Hematological: Bruises/bleeds easily (Bruises).   Psychiatric/Behavioral: Positive for confusion. Negative for agitation and sleep disturbance.       Objective   Vitals:    12/08/22 0846   BP: 126/79   Pulse: (!) 121   SpO2: 98%   Weight: 70.2 kg (154 lb 12.8 oz)   Height: 157.5 cm (62.01\")      /79   Pulse (!) 121   Ht 157.5 cm (62.01\")   Wt 70.2 kg (154 lb 12.8 oz)   SpO2 98%   BMI 28.31 kg/m²    Lab Results (most recent)     None        Physical Exam  Vitals and nursing note reviewed.   Constitutional:       General: She is not in acute distress.     Appearance: She is well-developed.   HENT:      Head: Normocephalic and atraumatic.   Eyes:      Conjunctiva/sclera: Conjunctivae normal.      Pupils: Pupils are equal, round, and reactive to light.   Neck:      Vascular: No JVD.      Trachea: No tracheal deviation.   Cardiovascular:      Rate and Rhythm: Normal rate. Rhythm irregular.      Heart sounds: Normal heart sounds.   Pulmonary:      Effort: Pulmonary effort is normal.      Breath sounds: Normal breath sounds.   Abdominal:      General: Bowel sounds are normal. There is no distension.      Palpations: Abdomen is soft. There is no mass.      Tenderness: There is no abdominal tenderness. There is no guarding or rebound.   Musculoskeletal:         General: No tenderness or deformity. Normal range of motion.      " Cervical back: Normal range of motion and neck supple.   Skin:     General: Skin is warm and dry.      Coloration: Skin is not pale.      Findings: No erythema or rash.   Neurological:      Mental Status: She is alert and oriented to person, place, and time.   Psychiatric:         Behavior: Behavior normal.         Thought Content: Thought content normal.         Judgment: Judgment normal.           Procedure   Procedures       Assessment & Plan      Diagnosis Plan   1. Atrial fibrillation, unspecified type (HCC)        2. Dyspnea on exertion        3. Primary hypertension          1.  Clinically, the patient feels fairly well.  She has symptomatic A. fib.  She frequently has rapid ventricular response rates noted.  I been hesitant to increase further rate suppressive therapies because of 6-second pause noted during sleep hours.  She has seen her EP provider who recommended ongoing monitoring.  Now that her event monitor is finalized, I have asked that a copy of that be forwarded onto his service and will await his comment.    2.  For now we will continue medications without change.    3.  She has nonobstructive CAD which we will manage medically.    4.  For change in clinical course she will return to clinic immediately.  Further pending all above.           Advance Care Planning   ACP discussion was declined by the patient. Patient does not have an advance directive, declines further assistance.                Electronically signed by:

## 2022-12-08 NOTE — TELEPHONE ENCOUNTER
Eliquis 5mg  [id number]  12/2024    Per provider told PT to take one half of the pill due to only having 5 mg samples. Also wrote on the box to take half in case she forgets. Kimberley Valerio MA

## 2022-12-09 ENCOUNTER — TELEPHONE (OUTPATIENT)
Dept: CARDIOLOGY | Facility: CLINIC | Age: 87
End: 2022-12-09

## 2022-12-20 DIAGNOSIS — R06.02 SHORTNESS OF BREATH: ICD-10-CM

## 2022-12-20 DIAGNOSIS — I48.91 ATRIAL FIBRILLATION, UNSPECIFIED TYPE: ICD-10-CM

## 2022-12-20 DIAGNOSIS — R00.2 PALPITATIONS: ICD-10-CM

## 2023-01-11 ENCOUNTER — MEDICATION THERAPY MANAGEMENT (OUTPATIENT)
Dept: CARDIOLOGY | Facility: CLINIC | Age: 88
End: 2023-01-11
Payer: MEDICARE

## 2023-01-11 ENCOUNTER — TELEPHONE (OUTPATIENT)
Dept: CARDIOLOGY | Facility: CLINIC | Age: 88
End: 2023-01-11
Payer: MEDICARE

## 2023-01-11 NOTE — TELEPHONE ENCOUNTER
The [facility] received a fax that requires your attention. The document has been indexed to the patient’s chart for your review.      Reason for sending: EXTERNAL MEDICAL RECORD NOTIFICATION    Documents Description: NOTIFICATION OF PT CARE CHANGE    Name of Sender: SOL ENGLE     Date Indexed: 1.10.23    Notes (if needed):

## 2023-01-11 NOTE — TELEPHONE ENCOUNTER
Per office note from Dr. Josue, pt's Dig reduced to 1/2 tab daily.  Samples set aside for the pt.  LVM notifying the pt.

## 2023-01-12 ENCOUNTER — OFFICE VISIT (OUTPATIENT)
Dept: CARDIOLOGY | Facility: CLINIC | Age: 88
End: 2023-01-12
Payer: MEDICARE

## 2023-01-12 VITALS
DIASTOLIC BLOOD PRESSURE: 76 MMHG | OXYGEN SATURATION: 96 % | HEIGHT: 62 IN | BODY MASS INDEX: 29.15 KG/M2 | HEART RATE: 119 BPM | SYSTOLIC BLOOD PRESSURE: 108 MMHG | WEIGHT: 158.4 LBS

## 2023-01-12 DIAGNOSIS — R06.02 SOB (SHORTNESS OF BREATH): ICD-10-CM

## 2023-01-12 DIAGNOSIS — I10 ESSENTIAL HYPERTENSION: ICD-10-CM

## 2023-01-12 DIAGNOSIS — I25.84 CORONARY ARTERY DISEASE DUE TO CALCIFIED CORONARY LESION: Primary | ICD-10-CM

## 2023-01-12 DIAGNOSIS — R06.02 SHORTNESS OF BREATH: ICD-10-CM

## 2023-01-12 DIAGNOSIS — I48.91 ATRIAL FIBRILLATION, UNSPECIFIED TYPE: ICD-10-CM

## 2023-01-12 DIAGNOSIS — E78.2 MIXED HYPERLIPIDEMIA: ICD-10-CM

## 2023-01-12 DIAGNOSIS — I25.10 CORONARY ARTERY DISEASE DUE TO CALCIFIED CORONARY LESION: Primary | ICD-10-CM

## 2023-01-12 DIAGNOSIS — I25.5 ISCHEMIC CARDIOMYOPATHY: ICD-10-CM

## 2023-01-12 PROCEDURE — 99215 OFFICE O/P EST HI 40 MIN: CPT | Performed by: INTERNAL MEDICINE

## 2023-01-12 RX ORDER — FUROSEMIDE 40 MG/1
40 TABLET ORAL DAILY
Qty: 30 TABLET | Refills: 5 | Status: SHIPPED | OUTPATIENT
Start: 2023-01-12 | End: 2023-02-08 | Stop reason: SDUPTHER

## 2023-01-12 NOTE — PROGRESS NOTES
Subjective   Rukhsana Guerrero is a 88 y.o. female     Chief Complaint   Patient presents with   • Follow-up     Here for 2 mo. F/u   • Coronary Artery Disease   • Hyperlipidemia   • Hypertension   • Cardiomyopathy   • Atrial Fibrillation       PROBLEM LIST:     1.  Coronary artery disease.  1.1.  Multivessel coronary artery disease per catheterization in 2006.  No targets for intervention was noted at that time and medical management was recommended.  She has been treated medically since.  1.2.  Repeat catheterization during hospitalization for non-ST elevation MI, 10/2022.  The patient again had diffuse coronary disease noted with no targets for intervention.  LAD was occluded mid vessel with no described collateral flow.  PDA was at 70% with otherwise noted moderate diffuse disease.  Medical management was recommended.  2.  Reported history of anterior wall MI.  3.  History of ischemic cardiomyopathy, estimated EF at 40% by previous work-up.  3.1.  Echocardiogram during hospitalization, 10/2022, again supports evidence of systolic dysfunction but with an EF of 45±5%.  4.  Atrial fibrillation, on Eliquis  5.  Hypertension  6.  Dyslipidemia    Specialty Problems        Cardiology Problems    Coronary artery disease due to calcified coronary lesion        Essential hypertension        Ischemic cardiomyopathy        Mixed hyperlipidemia             HPI:  Ms. Guerrero returns for follow-up after emergency room evaluation at [facility].    The patient was evaluated in the emergency room on 1215 with symptoms of increased shortness of breath, productive cough, fatigue, and rapid heart rate.  She was found to be in atrial fibrillation with a rapid ventricular response and there was some concern that there was a degree of congestive heart failure.  Troponin was minimally elevated.  Patient was also felt to have viral bronchitis.  Troponin was 62 BNP was 748 H&H were 14.8 and 44.8 with an MCV of 101 white  count was 12.5.  Blood gases demonstrated a PO2 of 64 pH 7.4 PCO2 37 bicarb 24    Ms. Guerrero has had some improvement since her ER evaluation but continues to have a dry nonproductive cough.  She has persistent lower extremity edema.  Although difficult to delineate clearly I believe she has some degree of orthopnea and PND.  She does not sodium restrict.    Review of prior records demonstrate persistent atrial fibrillation with a rapid ventricular response rate as well as a 6-second pause during presumed sleeping hours.  Medications are given by the patient's daughter who states that Ms. Guerrero is compliant with all medications.  Digoxin dose was recently reduced based on dig level in the setting of renal insufficiency.                      PRIOR MEDICATIONS    Current Outpatient Medications on File Prior to Visit   Medication Sig Dispense Refill   • allopurinol (ZYLOPRIM) 300 MG tablet Take 300 mg by mouth Daily.     • apixaban (ELIQUIS) 2.5 MG tablet tablet Take 1 tablet by mouth 2 (Two) Times a Day.     • aspirin 81 MG EC tablet Take 81 mg by mouth Daily.     • atorvastatin (LIPITOR) 40 MG tablet Take 1 tablet by mouth Daily.     • digoxin (LANOXIN) 125 MCG tablet Take 162.5 mcg by mouth Daily.     • metoprolol tartrate (LOPRESSOR) 100 MG tablet Take 1 tablet by mouth 2 (Two) Times a Day. 60 tablet 5   • sacubitril-valsartan (ENTRESTO) 24-26 MG tablet Take 1 tablet by mouth 2 (Two) Times a Day. 30 tablet 0     No current facility-administered medications on file prior to visit.       ALLERGIES:    Codeine    PAST MEDICAL HISTORY:    Past Medical History:   Diagnosis Date   • Abnormal ECG    • Atrial fibrillation (HCC)    • CHF (congestive heart failure) (HCC)    • Congenital heart disease    • Coronary artery disease    • COVID-19    • COVID-19 vaccine administered    • Diabetes mellitus (HCC)    • Gout    • Heart valve disease    • History of atrial fibrillation    • Hyperlipidemia    • Hypertension    • Myocardial  "infarction (HCC)    • Pneumonia    • Stroke (HCC)        SURGICAL HISTORY:    Past Surgical History:   Procedure Laterality Date   • APPENDECTOMY     • CARDIAC CATHETERIZATION     • GALLBLADDER SURGERY     • HYSTERECTOMY     • SHOULDER SURGERY         SOCIAL HISTORY:    Social History     Socioeconomic History   • Marital status:    Tobacco Use   • Smoking status: Never   • Smokeless tobacco: Never   Vaping Use   • Vaping Use: Never used   Substance and Sexual Activity   • Alcohol use: Never   • Drug use: Never   • Sexual activity: Not Currently     Birth control/protection: Post-menopausal       FAMILY HISTORY:    Family History   Problem Relation Age of Onset   • Pancreatitis Mother    • Heart disease Father    • Heart attack Father        Review of Systems   Constitutional: Positive for fatigue.   HENT: Negative.    Eyes: Positive for visual disturbance (glasses).   Respiratory: Positive for cough and shortness of breath.    Cardiovascular: Positive for palpitations (HX a-fib) and leg swelling. Negative for chest pain.   Gastrointestinal: Negative.  Negative for blood in stool.   Endocrine: Negative.    Genitourinary: Negative.  Negative for hematuria.   Musculoskeletal: Positive for arthralgias, gait problem (ambulates with 4 point cane) and myalgias.   Skin: Negative.    Allergic/Immunologic: Positive for environmental allergies.   Psychiatric/Behavioral: Positive for sleep disturbance.       VISIT VITALS:  Vitals:    01/12/23 1139   BP: 108/76   BP Location: Left arm   Patient Position: Sitting   Pulse: 119   SpO2: 96%   Weight: 71.8 kg (158 lb 6.4 oz)   Height: 157.5 cm (62.01\")      /76 (BP Location: Left arm, Patient Position: Sitting)   Pulse 119   Ht 157.5 cm (62.01\")   Wt 71.8 kg (158 lb 6.4 oz)   SpO2 96%   BMI 28.96 kg/m²     RECENT LABS:    Objective       Physical Exam  Vitals and nursing note reviewed.   Constitutional:       General: She is not in acute distress.     Appearance: " She is well-developed.   HENT:      Head: Normocephalic and atraumatic.   Eyes:      Conjunctiva/sclera: Conjunctivae normal.      Pupils: Pupils are equal, round, and reactive to light.   Neck:      Vascular: Hepatojugular reflux and JVD present. No carotid bruit.      Trachea: No tracheal deviation.      Comments: Nl. Carotid upstrokes  Cardiovascular:      Rate and Rhythm: Normal rate. Rhythm irregular.      Pulses:           Radial pulses are 2+ on the right side and 2+ on the left side.      Heart sounds: Heart sounds are distant. No murmur heard.    No friction rub.      Comments: H/r 120-130  Pulmonary:      Effort: Pulmonary effort is normal.      Breath sounds: Rales (diffuse) present. No wheezing or rhonchi.      Comments:     Abdominal:      General: Bowel sounds are normal. There is no distension.      Palpations: Abdomen is soft. There is no mass.      Tenderness: There is no abdominal tenderness. There is no guarding or rebound.   Musculoskeletal:         General: No tenderness or deformity. Normal range of motion.      Cervical back: Normal range of motion and neck supple.      Right lower leg: Edema present.      Left lower leg: Edema present.      Comments: LLE, 3+ edema above the ankle. Pedal pulses not assessed due to edema  RLE, 1+ edema to above the ankle, pedal pulses not assessed due to edema   Skin:     General: Skin is warm and dry.      Coloration: Skin is not pale.      Findings: No erythema or rash.   Neurological:      Mental Status: She is alert and oriented to person, place, and time.   Psychiatric:         Behavior: Behavior normal.         Thought Content: Thought content normal.         Judgment: Judgment normal.         Procedures      Assessment & Plan   #1.  Coronary artery disease.  Cath in November 2022 demonstrated totally occluded LAD with a large anteroapical infarct, ejection fraction 45 to 50%, 70% PDA stenosis and otherwise nonobstructive disease.  The patient was not  felt a candidate for mechanical intervention and medical management was recommended.    2.  Conduction system disease with atrial fibrillation and rapid ventricular response as well as a 6-second pause during sleep hours.  What is very concerning to me is that, despite abundant negative dromotropic therapy the patient has persistently elevated ventricular response rates in the 120s and 130s.  I believe this is contributing to congestive heart failure particularly given the patient's mixed systolic and diastolic dysfunction.  It is unclear to me why the patient has not been trialed on antidysrhythmic therapy particularly given her poor response to rate control.  I would like to discuss findings with Dr. Portillo who is seen the patient previously and recommended continued rate control.    3.  Congestive heart failure.  Physical exam today demonstrates significant pulmonary congestion.  We will start Lasix 40 mg on a daily basis and follow labs closely.  We will also check labs done several days ago particularly with regard to dig level in the setting of an estimated GFR of 28.    4.  Hypertension and dyslipidemia currently not acutely problematic.    5.  Ms. Guerrero will follow with Dr. Josue as instructed we will plan on seeing her in follow-up in 1 to 2 weeks or on appearing basis as discussed.    6.  Note: 60 minutes was spent on today's visit reviewing prior office notes, prior hospital records, obtaining a comprehensive history, performing a physical exam, discussed seeing potential diagnostic and therapeutic strategies, and in developing a detailed plan of care.   Diagnosis Plan   1. Coronary artery disease due to calcified coronary lesion        2. Essential hypertension        3. Ischemic cardiomyopathy        4. Mixed hyperlipidemia        5. Shortness of breath        6. Atrial fibrillation, unspecified type (HCC)            No follow-ups on file.         Rukhsana Guerrero  reports that she has never smoked. She has  never used smokeless tobacco.. I have educated her on the risk of diseases from using tobacco products such as cancer, COPD and heart disease.               BMI is >= 25 and <30. (Overweight) The following options were offered after discussion;: pcp addressing       Advance Care Planning   ACP discussion was held with the patient during this visit. Patient does not have an advance directive, declines further assistance.         Electronically signed by:    Scribed for Simon Veronica MD by Antonette Duran LPN on January 12, 2023  at 12:21 EST    I, Simon Veronica MD personally performed the services described in this documentation as scribed by the above named individual in my presence, and it is both accurate and complete. January 12, 2023 12:21 EST      Dictated Utilizing Dragon Dictation: Part of this note may be an electronic transcription/translation of spoken language to printed text using the Dragon Dictation System.

## 2023-01-17 ENCOUNTER — LAB (OUTPATIENT)
Dept: LAB | Facility: HOSPITAL | Age: 88
End: 2023-01-17
Payer: MEDICARE

## 2023-01-17 ENCOUNTER — TELEPHONE (OUTPATIENT)
Dept: CARDIOLOGY | Facility: CLINIC | Age: 88
End: 2023-01-17
Payer: MEDICARE

## 2023-01-17 DIAGNOSIS — I25.5 ISCHEMIC CARDIOMYOPATHY: ICD-10-CM

## 2023-01-17 DIAGNOSIS — R06.02 SOB (SHORTNESS OF BREATH): ICD-10-CM

## 2023-01-17 LAB
ANION GAP SERPL CALCULATED.3IONS-SCNC: 19.2 MMOL/L (ref 5–15)
BUN SERPL-MCNC: 27 MG/DL (ref 8–23)
BUN/CREAT SERPL: 17.6 (ref 7–25)
CALCIUM SPEC-SCNC: 9.7 MG/DL (ref 8.6–10.5)
CHLORIDE SERPL-SCNC: 91 MMOL/L (ref 98–107)
CO2 SERPL-SCNC: 24.8 MMOL/L (ref 22–29)
CREAT SERPL-MCNC: 1.53 MG/DL (ref 0.57–1)
EGFRCR SERPLBLD CKD-EPI 2021: 32.6 ML/MIN/1.73
GLUCOSE SERPL-MCNC: 202 MG/DL (ref 65–99)
MAGNESIUM SERPL-MCNC: 1.6 MG/DL (ref 1.6–2.4)
POTASSIUM SERPL-SCNC: 4.4 MMOL/L (ref 3.5–5.2)
SODIUM SERPL-SCNC: 135 MMOL/L (ref 136–145)

## 2023-01-17 PROCEDURE — 80048 BASIC METABOLIC PNL TOTAL CA: CPT | Performed by: INTERNAL MEDICINE

## 2023-01-17 PROCEDURE — 83735 ASSAY OF MAGNESIUM: CPT | Performed by: INTERNAL MEDICINE

## 2023-01-17 NOTE — TELEPHONE ENCOUNTER
Patient's daughter called to report since starting Lasix 40 mg daily her mother has been dizzy, started vomiting yesterday and feels dehydrated. She reports vitals are within normal limits. She does not feel her mother is drinking as much fluid as she should and wants to see what is recommended. She has one week follow up on Thursday of this week. She will be in the office today for labs. If possible she would like to be seen today. Her mother is having her hold Lasix today.     Simon Veronica MD Cheek, Laura Anne, MA  Caller: Unspecified (Today,  8:38 AM)  Hold Lasix today, take Lasix nola. ,And encourage po intake, get labs done and keep f/u appt./Thursday.     Patient's daughter informed. She will keep follow up as scheduled.

## 2023-01-19 ENCOUNTER — OFFICE VISIT (OUTPATIENT)
Dept: CARDIOLOGY | Facility: CLINIC | Age: 88
End: 2023-01-19
Payer: MEDICARE

## 2023-01-19 ENCOUNTER — TELEPHONE (OUTPATIENT)
Dept: CARDIOLOGY | Facility: CLINIC | Age: 88
End: 2023-01-19
Payer: MEDICARE

## 2023-01-19 VITALS
OXYGEN SATURATION: 96 % | HEIGHT: 62 IN | BODY MASS INDEX: 28.01 KG/M2 | WEIGHT: 152.2 LBS | DIASTOLIC BLOOD PRESSURE: 78 MMHG | SYSTOLIC BLOOD PRESSURE: 109 MMHG | HEART RATE: 73 BPM

## 2023-01-19 DIAGNOSIS — E78.2 MIXED HYPERLIPIDEMIA: ICD-10-CM

## 2023-01-19 DIAGNOSIS — I48.91 ATRIAL FIBRILLATION, UNSPECIFIED TYPE: Primary | ICD-10-CM

## 2023-01-19 DIAGNOSIS — I25.10 CORONARY ARTERY DISEASE DUE TO CALCIFIED CORONARY LESION: ICD-10-CM

## 2023-01-19 DIAGNOSIS — I10 ESSENTIAL HYPERTENSION: ICD-10-CM

## 2023-01-19 DIAGNOSIS — R06.02 SHORTNESS OF BREATH: ICD-10-CM

## 2023-01-19 DIAGNOSIS — I25.5 ISCHEMIC CARDIOMYOPATHY: ICD-10-CM

## 2023-01-19 DIAGNOSIS — I25.84 CORONARY ARTERY DISEASE DUE TO CALCIFIED CORONARY LESION: ICD-10-CM

## 2023-01-19 PROCEDURE — 99214 OFFICE O/P EST MOD 30 MIN: CPT | Performed by: INTERNAL MEDICINE

## 2023-01-19 RX ORDER — DIGOXIN 125 MCG
TABLET ORAL
Start: 2023-01-19 | End: 2023-02-08 | Stop reason: SDUPTHER

## 2023-01-19 RX ORDER — AMIODARONE HYDROCHLORIDE 200 MG/1
200 TABLET ORAL DAILY
Qty: 30 TABLET | Refills: 11 | Status: SHIPPED | OUTPATIENT
Start: 2023-01-19

## 2023-01-19 RX ORDER — METOPROLOL TARTRATE 50 MG/1
50 TABLET, FILM COATED ORAL 2 TIMES DAILY
Qty: 60 TABLET | Refills: 11 | Status: SHIPPED | OUTPATIENT
Start: 2023-01-19 | End: 2023-02-13 | Stop reason: SDUPTHER

## 2023-01-19 NOTE — TELEPHONE ENCOUNTER
Called [employer] due to missing proof of income as patient completed the section to have [employer] ran.     Was told by representative that the application submitted was an old form. All applications now require proof of income. Patient notified and will bring SSI benefit letter to office when having labs drawn.

## 2023-02-02 ENCOUNTER — LAB (OUTPATIENT)
Dept: LAB | Facility: HOSPITAL | Age: 88
End: 2023-02-02
Payer: MEDICARE

## 2023-02-02 DIAGNOSIS — I25.5 ISCHEMIC CARDIOMYOPATHY: ICD-10-CM

## 2023-02-02 DIAGNOSIS — I48.91 ATRIAL FIBRILLATION, UNSPECIFIED TYPE: ICD-10-CM

## 2023-02-02 DIAGNOSIS — R06.02 SHORTNESS OF BREATH: ICD-10-CM

## 2023-02-02 LAB
ANION GAP SERPL CALCULATED.3IONS-SCNC: 16.5 MMOL/L (ref 5–15)
BUN SERPL-MCNC: 34 MG/DL (ref 8–23)
BUN/CREAT SERPL: 15.2 (ref 7–25)
CALCIUM SPEC-SCNC: 10.1 MG/DL (ref 8.6–10.5)
CHLORIDE SERPL-SCNC: 96 MMOL/L (ref 98–107)
CO2 SERPL-SCNC: 27.5 MMOL/L (ref 22–29)
CREAT SERPL-MCNC: 2.23 MG/DL (ref 0.57–1)
EGFRCR SERPLBLD CKD-EPI 2021: 20.6 ML/MIN/1.73
GLUCOSE SERPL-MCNC: 152 MG/DL (ref 65–99)
MAGNESIUM SERPL-MCNC: 1.8 MG/DL (ref 1.6–2.4)
POTASSIUM SERPL-SCNC: 4.5 MMOL/L (ref 3.5–5.2)
SODIUM SERPL-SCNC: 140 MMOL/L (ref 136–145)

## 2023-02-02 PROCEDURE — 83735 ASSAY OF MAGNESIUM: CPT | Performed by: INTERNAL MEDICINE

## 2023-02-02 PROCEDURE — 80048 BASIC METABOLIC PNL TOTAL CA: CPT | Performed by: INTERNAL MEDICINE

## 2023-02-08 ENCOUNTER — CLINICAL SUPPORT (OUTPATIENT)
Dept: CARDIOLOGY | Facility: CLINIC | Age: 88
End: 2023-02-08
Payer: MEDICARE

## 2023-02-08 VITALS
BODY MASS INDEX: 27.12 KG/M2 | DIASTOLIC BLOOD PRESSURE: 64 MMHG | HEIGHT: 62 IN | OXYGEN SATURATION: 97 % | HEART RATE: 95 BPM | WEIGHT: 147.4 LBS | SYSTOLIC BLOOD PRESSURE: 99 MMHG

## 2023-02-08 DIAGNOSIS — I48.91 ATRIAL FIBRILLATION, UNSPECIFIED TYPE: Primary | ICD-10-CM

## 2023-02-08 DIAGNOSIS — I25.5 ISCHEMIC CARDIOMYOPATHY: ICD-10-CM

## 2023-02-08 DIAGNOSIS — R79.89 ELEVATED SERUM CREATININE: ICD-10-CM

## 2023-02-08 DIAGNOSIS — R06.02 SOB (SHORTNESS OF BREATH): ICD-10-CM

## 2023-02-08 PROCEDURE — 93005 ELECTROCARDIOGRAM TRACING: CPT | Performed by: INTERNAL MEDICINE

## 2023-02-08 RX ORDER — FUROSEMIDE 40 MG/1
40 TABLET ORAL DAILY
Qty: 30 TABLET | Refills: 5
Start: 2023-02-08 | End: 2023-02-15 | Stop reason: SDUPTHER

## 2023-02-08 RX ORDER — DIGOXIN 125 MCG
TABLET ORAL
Start: 2023-02-08 | End: 2023-02-15 | Stop reason: SDUPTHER

## 2023-02-08 NOTE — PROGRESS NOTES
Rukhsana Guerrero  1934 2/8/2023   ?   Chief Complaint   Patient presents with   • Atrial Fibrillation     Here for NV EKG, on Amiodarone      ?   HPI:     Patient here for NV EKG. Amiodarone 200 mg po daily was ordered on 1-. Confirms taking as prescribed along with Eliquis. Denies any bleeding. EKG done and to be reviewed by Dr. Veronica. PH,LPN  ?   ?   ?     Current Outpatient Medications:   •  allopurinol (ZYLOPRIM) 300 MG tablet, Take 300 mg by mouth Daily., Disp: , Rfl:   •  amiodarone (PACERONE) 200 MG tablet, Take 1 tablet by mouth Daily., Disp: 30 tablet, Rfl: 11  •  apixaban (ELIQUIS) 2.5 MG tablet tablet, Take 1 tablet by mouth 2 (Two) Times a Day., Disp: , Rfl:   •  aspirin 81 MG EC tablet, Take 81 mg by mouth Daily., Disp: , Rfl:   •  atorvastatin (LIPITOR) 40 MG tablet, Take 1 tablet by mouth Daily., Disp: , Rfl:   •  digoxin (LANOXIN) 125 MCG tablet, TAKE 1/2 TAB EVERY OTHER DAY (Patient taking differently: TAKE 1/2 TAB EVERY OTHER DAY HAD BEEN TAKING 1/2 TAB DAILY), Disp: , Rfl:   •  furosemide (LASIX) 40 MG tablet, Take 1 tablet by mouth Daily., Disp: 30 tablet, Rfl: 5  •  metoprolol tartrate (LOPRESSOR) 50 MG tablet, Take 1 tablet by mouth 2 (Two) Times a Day., Disp: 60 tablet, Rfl: 11  •  sacubitril-valsartan (ENTRESTO) 24-26 MG tablet, Take 0.5 tablets by mouth 2 (Two) Times a Day., Disp: 56 tablet, Rfl: 0   ?   ?   Codeine         ECG 12 Lead    Date/Time: 2/8/2023 11:53 AM  Performed by: Simon Veronica MD  Authorized by: Simon Veronica MD   Comparison: not compared with previous ECG                ?   Assessment & Plan      1. A-Fib, on Amiodarone    EKG along with recent lab results reviewed by Dr. Veronica. Creat. Elevated to 2.23 so v/o's per Dr. Veronica to have BMP/MAG/BNP drawn on Monday and hold Lasix and digoxin until lab results back. Patient and daughter both aware of above and will come to lab downstairs on Monday. Verbalized they understood. PH,LPN   ?   ?   ?

## 2023-02-13 ENCOUNTER — LAB (OUTPATIENT)
Dept: LAB | Facility: HOSPITAL | Age: 88
End: 2023-02-13
Payer: MEDICARE

## 2023-02-13 DIAGNOSIS — I48.91 ATRIAL FIBRILLATION, UNSPECIFIED TYPE: ICD-10-CM

## 2023-02-13 DIAGNOSIS — R79.89 ELEVATED SERUM CREATININE: ICD-10-CM

## 2023-02-13 LAB
ANION GAP SERPL CALCULATED.3IONS-SCNC: 16.8 MMOL/L (ref 5–15)
BUN SERPL-MCNC: 29 MG/DL (ref 8–23)
BUN/CREAT SERPL: 17.3 (ref 7–25)
CALCIUM SPEC-SCNC: 9.2 MG/DL (ref 8.6–10.5)
CHLORIDE SERPL-SCNC: 100 MMOL/L (ref 98–107)
CO2 SERPL-SCNC: 20.2 MMOL/L (ref 22–29)
CREAT SERPL-MCNC: 1.68 MG/DL (ref 0.57–1)
EGFRCR SERPLBLD CKD-EPI 2021: 29 ML/MIN/1.73
GLUCOSE SERPL-MCNC: 233 MG/DL (ref 65–99)
MAGNESIUM SERPL-MCNC: 1.8 MG/DL (ref 1.6–2.4)
NT-PROBNP SERPL-MCNC: 4422 PG/ML (ref 0–1800)
POTASSIUM SERPL-SCNC: 5.2 MMOL/L (ref 3.5–5.2)
SODIUM SERPL-SCNC: 137 MMOL/L (ref 136–145)

## 2023-02-13 PROCEDURE — 80048 BASIC METABOLIC PNL TOTAL CA: CPT | Performed by: INTERNAL MEDICINE

## 2023-02-13 PROCEDURE — 83880 ASSAY OF NATRIURETIC PEPTIDE: CPT | Performed by: INTERNAL MEDICINE

## 2023-02-13 PROCEDURE — 83735 ASSAY OF MAGNESIUM: CPT | Performed by: INTERNAL MEDICINE

## 2023-02-13 RX ORDER — METOPROLOL TARTRATE 50 MG/1
50 TABLET, FILM COATED ORAL 2 TIMES DAILY
Qty: 180 TABLET | Refills: 3 | Status: SHIPPED | OUTPATIENT
Start: 2023-02-13

## 2023-02-15 ENCOUNTER — TELEPHONE (OUTPATIENT)
Dept: CARDIOLOGY | Facility: CLINIC | Age: 88
End: 2023-02-15
Payer: MEDICARE

## 2023-02-15 DIAGNOSIS — R06.02 SOB (SHORTNESS OF BREATH): ICD-10-CM

## 2023-02-15 DIAGNOSIS — I25.5 ISCHEMIC CARDIOMYOPATHY: ICD-10-CM

## 2023-02-15 DIAGNOSIS — I48.91 ATRIAL FIBRILLATION, UNSPECIFIED TYPE: ICD-10-CM

## 2023-02-15 RX ORDER — FUROSEMIDE 20 MG/1
20 TABLET ORAL DAILY
Start: 2023-02-15

## 2023-02-15 RX ORDER — DIGOXIN 125 MCG
TABLET ORAL
Start: 2023-02-15

## 2023-02-15 NOTE — TELEPHONE ENCOUNTER
LAB RESULTS FROM Monday DISCUSSED WITH DR. GUNN. CREAT. IMPROVED WITH HOLDING LASIX AND DIG. BUT BNP MORE ELEVATED. T/O PER DR. GUNN TO RESTART LASIX BACK BUT ONLY AT 20 MG DAILY, RESUME DIG. AS PREVIOUSLY TAKING, REPEAT BMP/MAG/BNP IN 2 WEEKS, AND SCHEDULE 3-4 WEEK F/U APPT. BUT IT DOESN'T HAVE TO BE WITH HIM. SPOKE WITH DAUGHTER, LOUISE, AND SHE WROTE ALL OF ABOVE DOWN AND VERBALIZED SHE UNDERSTOOD. AWARE TO CALL THE OFFICE BACK WITH ANY ISSUES / CONCERNS SHE DID STATE THAT MOTHER HAS BEEN COUGHING MORE AND BLE EDEMA LITTLE WORSE. PH,LPN

## 2023-02-21 ENCOUNTER — TELEPHONE (OUTPATIENT)
Dept: CARDIOLOGY | Facility: CLINIC | Age: 88
End: 2023-02-21

## 2023-02-21 NOTE — TELEPHONE ENCOUNTER
Caller: GABRIELLANOLBERTO    Relationship: Emergency Contact    Best call back number: [phone]    What was the call regarding: PT. DAUGHTER CALLED TO RESCHEDULE F/U APPOINTMENT FROM DR. GARCIA TO TRISH [facility]. REQUESTING LAB WORK BE TRANSFERRED TO PCP, DR. ENGLE'S OFFICE @ OFFICE PH#: [phone]

## 2023-03-02 ENCOUNTER — LAB (OUTPATIENT)
Dept: LAB | Facility: HOSPITAL | Age: 88
End: 2023-03-02
Payer: MEDICARE

## 2023-03-02 DIAGNOSIS — R06.02 SOB (SHORTNESS OF BREATH): ICD-10-CM

## 2023-03-02 DIAGNOSIS — I25.5 ISCHEMIC CARDIOMYOPATHY: ICD-10-CM

## 2023-03-02 LAB
ANION GAP SERPL CALCULATED.3IONS-SCNC: 13.8 MMOL/L (ref 5–15)
BUN SERPL-MCNC: 30 MG/DL (ref 8–23)
BUN/CREAT SERPL: 15.2 (ref 7–25)
CALCIUM SPEC-SCNC: 9.4 MG/DL (ref 8.6–10.5)
CHLORIDE SERPL-SCNC: 100 MMOL/L (ref 98–107)
CO2 SERPL-SCNC: 27.2 MMOL/L (ref 22–29)
CREAT SERPL-MCNC: 1.98 MG/DL (ref 0.57–1)
EGFRCR SERPLBLD CKD-EPI 2021: 23.8 ML/MIN/1.73
GLUCOSE SERPL-MCNC: 177 MG/DL (ref 65–99)
MAGNESIUM SERPL-MCNC: 1.9 MG/DL (ref 1.6–2.4)
NT-PROBNP SERPL-MCNC: 2638 PG/ML (ref 0–1800)
POTASSIUM SERPL-SCNC: 4.3 MMOL/L (ref 3.5–5.2)
SODIUM SERPL-SCNC: 141 MMOL/L (ref 136–145)

## 2023-03-02 PROCEDURE — 83735 ASSAY OF MAGNESIUM: CPT | Performed by: INTERNAL MEDICINE

## 2023-03-02 PROCEDURE — 80048 BASIC METABOLIC PNL TOTAL CA: CPT | Performed by: INTERNAL MEDICINE

## 2023-03-02 PROCEDURE — 83880 ASSAY OF NATRIURETIC PEPTIDE: CPT | Performed by: INTERNAL MEDICINE

## 2023-03-08 ENCOUNTER — OFFICE VISIT (OUTPATIENT)
Dept: CARDIOLOGY | Facility: CLINIC | Age: 88
End: 2023-03-08
Payer: MEDICARE

## 2023-03-08 VITALS
HEIGHT: 62 IN | BODY MASS INDEX: 27.79 KG/M2 | DIASTOLIC BLOOD PRESSURE: 89 MMHG | WEIGHT: 151 LBS | HEART RATE: 116 BPM | SYSTOLIC BLOOD PRESSURE: 130 MMHG

## 2023-03-08 DIAGNOSIS — I48.19 PERSISTENT ATRIAL FIBRILLATION: ICD-10-CM

## 2023-03-08 DIAGNOSIS — I25.10 CORONARY ARTERY DISEASE DUE TO CALCIFIED CORONARY LESION: Primary | ICD-10-CM

## 2023-03-08 DIAGNOSIS — I25.84 CORONARY ARTERY DISEASE DUE TO CALCIFIED CORONARY LESION: Primary | ICD-10-CM

## 2023-03-08 DIAGNOSIS — R06.02 SHORTNESS OF BREATH: ICD-10-CM

## 2023-03-08 DIAGNOSIS — I10 ESSENTIAL HYPERTENSION: ICD-10-CM

## 2023-03-08 DIAGNOSIS — I25.5 ISCHEMIC CARDIOMYOPATHY: ICD-10-CM

## 2023-03-08 DIAGNOSIS — E78.2 MIXED HYPERLIPIDEMIA: ICD-10-CM

## 2023-03-08 PROCEDURE — 93000 ELECTROCARDIOGRAM COMPLETE: CPT | Performed by: NURSE PRACTITIONER

## 2023-03-08 PROCEDURE — 99214 OFFICE O/P EST MOD 30 MIN: CPT | Performed by: NURSE PRACTITIONER

## 2023-03-08 PROCEDURE — 1159F MED LIST DOCD IN RCRD: CPT | Performed by: NURSE PRACTITIONER

## 2023-03-08 PROCEDURE — 1160F RVW MEDS BY RX/DR IN RCRD: CPT | Performed by: NURSE PRACTITIONER

## 2023-03-08 NOTE — PROGRESS NOTES
Subjective     Rukhsana Guerrero is a 89 y.o. female who presents to day for Atrial Fibrillation and 3-4 week follow up .    CHIEF COMPLIANT  Chief Complaint   Patient presents with   • Atrial Fibrillation   • 3-4 week follow up        Active Problems:  Problem List Items Addressed This Visit        Cardiac and Vasculature    Coronary artery disease due to calcified coronary lesion - Primary    Essential hypertension    Mixed hyperlipidemia    Ischemic cardiomyopathy    Atrial fibrillation (HCC)       Pulmonary and Pneumonias    Shortness of breath   PROBLEM LIST:      1.  Coronary artery disease.  1.1.  Multivessel coronary artery disease per catheterization in 2006.  No targets for intervention was noted at that time and medical management was recommended.  She has been treated medically since.  1.2.  Repeat catheterization during hospitalization for non-ST elevation MI, 10/2022.  The patient again had diffuse coronary disease noted with no targets for intervention.  LAD was occluded mid vessel with no described collateral flow.  PDA was at 70% with otherwise noted moderate diffuse disease.  Medical management was recommended.  2.  Reported history of anterior wall MI.  3.  ICM, estimated EF at 40% by previous work-up.  3.1.  Echocardiogram during hospitalization, 10/2022, again supports evidence of systolic dysfunction but with an EF of 45±5%.  4.  Atrial fibrillation, on Eliquis  5.  Hypertension  6.  Dyslipidemia    HPI  Atrial Fibrillation  Symptoms include dizziness ( with getting up quick) and shortness of breath (with exertion). Symptoms are negative for chest pain, palpitations and weakness. Past medical history includes atrial fibrillation.     Rukhsana Guerrero is an 89-year-old female patient who presents to the clinic today for a follow-up visit for atrial fibrillation. She is accompanied by one of her daughters.     The patient does have a history of atrial fibrillation with rapid ventricular response  "(RVR), which has not been well tolerated due to hypotension. She is currently being treated with Eliquis 2.5 mg twice daily, digoxin 125 mcg daily, and amiodarone 200 mg daily. She is also on aspirin for antiplatelet therapy. She was previously seen by Dr. Veronica in 01/2023 and started on amiodarone at that time, trying to chemically convert her out of atrial fibrillation. If she was not converted chemically, we would move forth with cardioversion after 3 to 4 weeks of therapy. 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     Ms. Guerrero is \"doing okay.\"  Her echocardiogram from 10/2022 was reviewed with her today. She voices concerns about the cardioversion, and has been wanting to avoid the procedure, especially if there is not a 100 percent guarantee that it will stop the atrial fibrillation. She queries if the cardioversion can be done anywhere other than this hospital. She had a really bad experience here and does not trust this hospital. She prefers not to utilize the facilities.     She was initially started on Lasix 40 mg by Dr. Veronica, which was later reduced to 20 mg daily. She tolerates the Lasix well; she just hates having to urinate frequently. She does wear compression socks which helps with the bilateral lower extremity edema.     She denies orthopnea. However, she sleeps on her side. She does experience dyspnea with exertion, such as walking through a store. She does not become winded while walking to the bathroom. She does ambulate with a walker around her house.     She did complete recent lab work on 03/02/2023.  Her proBNP has decreased to 2600 pg/mL. Although improving, she is still retaining fluids. Her creatinine is 1.98 mg/dL which has improved over 1 month ago. Her sodium, potassium, chloride, and magnesium are all good.     She has histories of left shoulder surgery, right shoulder " surgery, and knee surgery. She suffered a stroke after her last shoulder surgery.    Patient does have a history of heart failure with reduced ejection fraction.  She is currently on Entresto, metoprolol.    PRIOR MEDS  Current Outpatient Medications on File Prior to Visit   Medication Sig Dispense Refill   • allopurinol (ZYLOPRIM) 300 MG tablet Take 1 tablet by mouth Daily.     • amiodarone (PACERONE) 200 MG tablet Take 1 tablet by mouth Daily. 30 tablet 11   • apixaban (ELIQUIS) 2.5 MG tablet tablet Take 1 tablet by mouth 2 (Two) Times a Day.     • aspirin 81 MG EC tablet Take 1 tablet by mouth Daily.     • atorvastatin (LIPITOR) 40 MG tablet Take 1 tablet by mouth Daily.     • digoxin (LANOXIN) 125 MCG tablet TAKE 1/2 TAB EVERY OTHER DAY     • furosemide (LASIX) 20 MG tablet Take 1 tablet by mouth Daily.     • metoprolol tartrate (LOPRESSOR) 50 MG tablet Take 1 tablet by mouth 2 (Two) Times a Day. 180 tablet 3   • sacubitril-valsartan (ENTRESTO) 24-26 MG tablet Take 0.5 tablets by mouth 2 (Two) Times a Day. 56 tablet 0     No current facility-administered medications on file prior to visit.       ALLERGIES  Codeine    HISTORY  Past Medical History:   Diagnosis Date   • Abnormal ECG    • Atrial fibrillation (HCC)    • CHF (congestive heart failure) (HCC)    • Congenital heart disease    • Coronary artery disease    • COVID-19    • COVID-19 vaccine administered    • Diabetes mellitus (HCC)    • Gout    • Heart valve disease    • History of atrial fibrillation    • Hyperlipidemia    • Hypertension    • Ischemic cardiomyopathy    • Myocardial infarction (HCC)    • Pneumonia    • Stroke (HCC)        Social History     Socioeconomic History   • Marital status:    Tobacco Use   • Smoking status: Never   • Smokeless tobacco: Never   Vaping Use   • Vaping Use: Never used   Substance and Sexual Activity   • Alcohol use: Never   • Drug use: Never   • Sexual activity: Not Currently     Birth control/protection:  "Post-menopausal       Family History   Problem Relation Age of Onset   • Pancreatitis Mother    • Heart disease Father    • Heart attack Father        Review of Systems   Constitutional: Negative for chills, fatigue and fever.   HENT: Positive for rhinorrhea. Negative for congestion and sore throat.    Respiratory: Positive for shortness of breath (with exertion). Negative for chest tightness.    Cardiovascular: Positive for leg swelling (mild does not have a lot now with addition of laxis). Negative for chest pain and palpitations.   Gastrointestinal: Negative for constipation, diarrhea and nausea.   Musculoskeletal: Positive for arthralgias. Negative for back pain and neck pain.   Allergic/Immunologic: Positive for environmental allergies. Negative for food allergies.   Neurological: Positive for dizziness ( with getting up quick) and light-headedness. Negative for syncope and weakness.   Hematological: Bruises/bleeds easily (bruise).   Psychiatric/Behavioral: Positive for sleep disturbance (No SOB does not sleep good).       Objective     VITALS: /89 (BP Location: Left arm, Patient Position: Sitting, Cuff Size: Adult)   Pulse 116   Ht 157.5 cm (62.01\")   Wt 68.5 kg (151 lb)   BMI 27.61 kg/m²     LABS:   Lab Results (most recent)     None      Lab work dated 03/02/2023 was reviewed today. Her proBNP has decreased to 2600 pg/mL. Creatinine is 1.98 mg/dL which has improved over 1 month ago when it was 2.23 mg/dL. Sodium, potassium, chloride, and magnesium are all good.       IMAGING:   No Images in the past 120 days found..    EXAM:  Physical Exam  Vitals and nursing note reviewed.   Constitutional:       Appearance: She is well-developed.   Eyes:      Pupils: Pupils are equal, round, and reactive to light.   Neck:      Thyroid: No thyroid mass.      Vascular: No carotid bruit or JVD.      Trachea: Trachea and phonation normal.   Cardiovascular:      Rate and Rhythm: Tachycardia present. Rhythm irregular. "      Pulses:           Radial pulses are 2+ on the right side and 2+ on the left side.        Posterior tibial pulses are 2+ on the right side and 2+ on the left side.      Heart sounds: Normal heart sounds. No murmur heard.    No friction rub. No gallop.   Pulmonary:      Effort: Pulmonary effort is normal. No respiratory distress.      Breath sounds: Normal breath sounds. No wheezing or rales.   Abdominal:      General: Bowel sounds are normal.      Palpations: Abdomen is soft.   Musculoskeletal:         General: Swelling (1-2 ble) present. Normal range of motion.      Cervical back: Neck supple.   Skin:     General: Skin is warm and dry.      Capillary Refill: Capillary refill takes less than 2 seconds.      Findings: No rash.   Neurological:      Mental Status: She is alert and oriented to person, place, and time.   Psychiatric:         Speech: Speech normal.         Behavior: Behavior normal.         Thought Content: Thought content normal.         Judgment: Judgment normal.          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       Assessment & Plan    Diagnosis Plan   1. Coronary artery disease due to calcified coronary lesion        2. Persistent atrial fibrillation (HCC)        3. Essential hypertension        4. Ischemic cardiomyopathy        5. Mixed hyperlipidemia        6. Shortness of breath            Return if symptoms worsen or fail to improve, for keep appointment with [facility].    Diagnoses and all orders for this  visit:    1. Coronary artery disease due to calcified coronary lesion (Primary)  -     Cancel: ECG 12 Lead    2. Persistent atrial fibrillation (HCC)  -     Cancel: ECG 12 Lead    3. Essential hypertension  -     Cancel: ECG 12 Lead    4. Ischemic cardiomyopathy  -     Cancel: ECG 12 Lead    5. Mixed hyperlipidemia  -     Cancel: ECG 12 Lead    6. Shortness of breath  -     Cancel: ECG 12 Lead      Plan   1. Transthoracic echocardiogram from 10/2022 performed at [facility] showed ejection fraction of 45 percent, plus or minus 5 percent. Atrial fibrillation with rapid ventricular response. Heart rate of 174 beats per minute. Chemical intervention with amiodarone was proceeded with at that time and possible future cardioversion was also discussed.   2. The patient's electrocardiogram performed in-office today shows she is still in atrial fibrillation with a heart rate of 115 beats per minute.  Discussed cardioversion in detail with the patient today. She was given additional information regarding electrical cardioversion. She declined cardioversion today; however, she will go home and discuss with family members. She will contact the office if she decides to proceed with cardioversion.   3. She will continue on the current medication regimen. She was advised to monitor sodium intake.   4.  Patient's blood pressure is controlled on current blood pressure medication regimen.  No medication changes are warranted at this time.  Patient advised to monitor blood pressure on a daily basis and report any persistent highs or lows.  Set goal blood pressure for patient at 130/80 or below.  5.  Informed of signs and symptoms of ACS and advised to seek emergent treatment for any new worsening symptoms.  Patient also advised sooner follow-up as needed.  Also advised to follow-up with family doctor as needed  This note is dictated utilizing voice recognition software.  Although this record has been proof read,  transcriptional errors may still be present. If questions occur regarding the content of this record please do not hesitate to call our office.  I have reviewed and confirmed the accuracy of the ROS as documented by the MA/LPN/RN MAYKEL Dsouza    Assessment  1. Coronary artery disease due to calcified coronary lesion  2. Persistent atrial fibrillation (HCC)  3. Essential hypertension  4. Ischemic cardiomyopathy  5. Mixed hyperlipidemia  6. Shortness of breath    Patient will follow up in 6 months with TRISH Hernandez.       Rukhsana Guerrero  reports that she has never smoked. She has never used smokeless tobacco.. I have educated her on the risk of diseases from using tobacco products .       MEDS ORDERED DURING VISIT:  No orders of the defined types were placed in this encounter.          This document has been electronically signed by MAYKEL Dsouza Jr.  March 10, 2023 15:32 EST     Transcribed from ambient dictation for MAYKEL Dsouza by Luisana Jansen.  03/08/23   18:21 EST    Patient or patient representative verbalized consent to the visit recording.  I have personally performed the services described in this document as transcribed by the above individual, and it is both accurate and complete.

## 2023-03-17 ENCOUNTER — TELEPHONE (OUTPATIENT)
Dept: CARDIOLOGY | Facility: CLINIC | Age: 88
End: 2023-03-17
Payer: MEDICARE

## 2023-03-17 NOTE — TELEPHONE ENCOUNTER
----- Message from KATHLEEN Plunkett sent at 3/15/2023 11:49 AM EDT -----  Patients daughter came into the office today and states she wants to proceed with cardioversion. She wants to go to [facility] not [facility]. Please address and call patient with update.

## 2023-03-17 NOTE — TELEPHONE ENCOUNTER
I sent a message to  to see if it is ok for me to put in orders for this patient.      Rosa OLIVER

## 2023-03-27 ENCOUNTER — TELEPHONE (OUTPATIENT)
Dept: CARDIOLOGY | Facility: CLINIC | Age: 88
End: 2023-03-27
Payer: MEDICARE

## 2023-03-27 DIAGNOSIS — I48.19 PERSISTENT ATRIAL FIBRILLATION: Primary | ICD-10-CM

## 2023-03-27 DIAGNOSIS — I25.5 ISCHEMIC CARDIOMYOPATHY: ICD-10-CM

## 2023-03-27 NOTE — TELEPHONE ENCOUNTER
Caller: NATHALIE ERICKSON    Relationship: Emergency Contact    Best call back number: [phone]    What is the best time to reach you: ANYTIME     Who are you requesting to speak with (clinical staff, provider,  specific staff member): ANYONE     What was the call regarding: PATIENTS DAUGHTER CALLING TO INFORM THAT THEY WOULD LIKE TO PROCEED WITH SCHEDULING CARDIOVERSION IN [employer].     Do you require a callback: YES

## 2023-04-11 DIAGNOSIS — I48.91 ATRIAL FIBRILLATION, UNSPECIFIED TYPE: ICD-10-CM

## 2023-04-11 RX ORDER — AMIODARONE HYDROCHLORIDE 200 MG/1
200 TABLET ORAL DAILY
Qty: 90 TABLET | Refills: 3 | Status: SHIPPED | OUTPATIENT
Start: 2023-04-11

## 2023-04-13 ENCOUNTER — OUTSIDE FACILITY SERVICE (OUTPATIENT)
Dept: CARDIOLOGY | Facility: CLINIC | Age: 88
End: 2023-04-13
Payer: MEDICARE

## 2023-04-13 DIAGNOSIS — I25.5 ISCHEMIC CARDIOMYOPATHY: ICD-10-CM

## 2023-04-13 DIAGNOSIS — I48.19 PERSISTENT ATRIAL FIBRILLATION: ICD-10-CM

## 2023-04-13 PROCEDURE — 92960 CARDIOVERSION ELECTRIC EXT: CPT | Performed by: INTERNAL MEDICINE

## 2023-04-14 ENCOUNTER — CLINICAL SUPPORT (OUTPATIENT)
Dept: CARDIOLOGY | Facility: CLINIC | Age: 88
End: 2023-04-14
Payer: MEDICARE

## 2023-04-14 VITALS
HEART RATE: 77 BPM | DIASTOLIC BLOOD PRESSURE: 72 MMHG | HEIGHT: 62 IN | BODY MASS INDEX: 28.6 KG/M2 | SYSTOLIC BLOOD PRESSURE: 117 MMHG | OXYGEN SATURATION: 97 % | WEIGHT: 155.4 LBS

## 2023-04-14 DIAGNOSIS — I48.91 ATRIAL FIBRILLATION, UNSPECIFIED TYPE: Primary | ICD-10-CM

## 2023-04-14 NOTE — PROGRESS NOTES
Rukhsana Guerrero  1934 4/14/2023   ?   Chief Complaint   Patient presents with   • Atrial Fibrillation     Here for NV EKG s/p CV yest.       ?   HPI:   ?   ? Here for NV EKG s/p CV yest. Per Dr. Veronica for known Hx of a-fib. Tolerated procedure per their account and states feels fine today. Confirms taking meds as prescribed and denies any bleeding on Eliquis.  EKG done and to be reviewed by MARTÍN Hernandez. PH,LPN  ?     Current Outpatient Medications:   •  allopurinol (ZYLOPRIM) 300 MG tablet, Take 1 tablet by mouth Daily., Disp: , Rfl:   •  amiodarone (PACERONE) 200 MG tablet, Take 1 tablet by mouth Daily., Disp: 90 tablet, Rfl: 3  •  apixaban (ELIQUIS) 2.5 MG tablet tablet, Take 1 tablet by mouth 2 (Two) Times a Day., Disp: , Rfl:   •  aspirin 81 MG EC tablet, Take 1 tablet by mouth Daily., Disp: , Rfl:   •  atorvastatin (LIPITOR) 40 MG tablet, Take 1 tablet by mouth Daily., Disp: , Rfl:   •  digoxin (LANOXIN) 125 MCG tablet, TAKE 1/2 TAB EVERY OTHER DAY, Disp: , Rfl:   •  furosemide (LASIX) 20 MG tablet, Take 1 tablet by mouth Daily., Disp: , Rfl:   •  metoprolol tartrate (LOPRESSOR) 50 MG tablet, Take 1 tablet by mouth 2 (Two) Times a Day., Disp: 180 tablet, Rfl: 3  •  sacubitril-valsartan (ENTRESTO) 24-26 MG tablet, Take 0.5 tablets by mouth 2 (Two) Times a Day., Disp: 56 tablet, Rfl: 0   ?   ?   Codeine         ECG 12 Lead    Date/Time: 4/14/2023 11:46 AM  Performed by: Everett Salas PA  Authorized by: Everett Salas PA   Comparison: not compared with previous ECG                ?   Assessment & Plan      1. 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 Also wanted patient to be informed that with any dizziness, weakness, or concerns must be seen. Discussed above with patient and daughter and they verbalized they understood. PH,LPN  ?    ?   ?

## 2023-04-25 ENCOUNTER — CLINICAL SUPPORT (OUTPATIENT)
Dept: CARDIOLOGY | Facility: CLINIC | Age: 88
End: 2023-04-25
Payer: MEDICARE

## 2023-04-25 VITALS
HEART RATE: 62 BPM | HEIGHT: 62 IN | OXYGEN SATURATION: 96 % | BODY MASS INDEX: 28.16 KG/M2 | SYSTOLIC BLOOD PRESSURE: 140 MMHG | WEIGHT: 153 LBS | DIASTOLIC BLOOD PRESSURE: 66 MMHG

## 2023-04-25 DIAGNOSIS — R06.02 SHORTNESS OF BREATH: Primary | ICD-10-CM

## 2023-04-25 NOTE — PROGRESS NOTES
"Rukhsana Guerrero  1934 4/25/2023   ?   Chief Complaint   Patient presents with   • Nurse visit      ?   HPI:   ?   ? 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 Also wanted patient to be informed that with any dizziness, weakness, or concerns must be seen. Discussed above with patient and daughter and they verbalized they understood. PH,LPN\"  ?   ?     ?     Current Outpatient Medications:   •  allopurinol (ZYLOPRIM) 300 MG tablet, Take 1 tablet by mouth Daily., Disp: , Rfl:   •  amiodarone (PACERONE) 200 MG tablet, Take 1 tablet by mouth Daily., Disp: 90 tablet, Rfl: 3  •  apixaban (ELIQUIS) 2.5 MG tablet tablet, Take 1 tablet by mouth 2 (Two) Times a Day., Disp: , Rfl:   •  aspirin 81 MG EC tablet, Take 1 tablet by mouth Daily., Disp: , Rfl:   •  atorvastatin (LIPITOR) 40 MG tablet, Take 1 tablet by mouth Daily., Disp: , Rfl:   •  digoxin (LANOXIN) 125 MCG tablet, TAKE 1/2 TAB EVERY OTHER DAY, Disp: , Rfl:   •  furosemide (LASIX) 20 MG tablet, Take 1 tablet by mouth Daily., Disp: , Rfl:   •  metoprolol tartrate (LOPRESSOR) 50 MG tablet, Take 1 tablet by mouth 2 (Two) Times a Day., Disp: 180 tablet, Rfl: 3  •  sacubitril-valsartan (ENTRESTO) 24-26 MG tablet, Take 0.5 tablets by mouth 2 (Two) Times a Day., Disp: 56 tablet, Rfl: 0   ?   ?   Codeine         ECG 12 Lead    Date/Time: 4/25/2023 11:24 AM  Performed by: Everett Salas PA  Authorized by: Everett Salas PA   Comparison: compared with previous ECG from 4/14/2023               ?   Assessment & Plan    ?   1. ? 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   Pt denies any palpitations or CP since last being seen.   Pt's daughter stated pt was told there were three issues w/ last EKG that they wanted to check up on today.     Performed EKG.     Per verbal order from " Everett Salas PA-C:  EKG is stable, will continue to monitor. Pt needs to call us for any new or worsening symptoms.     Informed pt of the above instructions. They verbalized understanding and are aware of plan.   - BENITO Tavares   ?

## 2023-07-29 DIAGNOSIS — R06.02 SOB (SHORTNESS OF BREATH): ICD-10-CM

## 2023-07-29 DIAGNOSIS — I25.5 ISCHEMIC CARDIOMYOPATHY: ICD-10-CM

## 2023-07-31 RX ORDER — FUROSEMIDE 40 MG/1
TABLET ORAL
Qty: 30 TABLET | Refills: 5 | OUTPATIENT
Start: 2023-07-31

## 2023-08-11 ENCOUNTER — TELEPHONE (OUTPATIENT)
Dept: CARDIOLOGY | Facility: CLINIC | Age: 88
End: 2023-08-11
Payer: MEDICARE

## 2023-08-11 DIAGNOSIS — R06.02 SOB (SHORTNESS OF BREATH): ICD-10-CM

## 2023-08-11 DIAGNOSIS — I25.5 ISCHEMIC CARDIOMYOPATHY: ICD-10-CM

## 2023-08-11 RX ORDER — FUROSEMIDE 20 MG/1
20 TABLET ORAL DAILY
Qty: 90 TABLET | Refills: 1 | Status: SHIPPED | OUTPATIENT
Start: 2023-08-11

## 2023-08-11 NOTE — TELEPHONE ENCOUNTER
From: Antonette Brown RegSched Rep  Sent: 8/11/2023  11:53 AM EDT  To: Antonette Duran LPN, Ebony Roy, *    Rukhsana's daughter came by the office and states that [facility] would not refill her lasix because it is not on her active rx list. Daughter states she uses 20 mg instead of 40 mg. That  reduced it to the 20 mg. She uses [facility] in [place].     LASIX REFILLS SENT TO [facility]. PH,LPN

## 2023-09-14 ENCOUNTER — OFFICE VISIT (OUTPATIENT)
Dept: CARDIOLOGY | Facility: CLINIC | Age: 88
End: 2023-09-14
Payer: MEDICARE

## 2023-09-14 VITALS
HEIGHT: 62 IN | DIASTOLIC BLOOD PRESSURE: 62 MMHG | SYSTOLIC BLOOD PRESSURE: 115 MMHG | HEART RATE: 68 BPM | WEIGHT: 150.4 LBS | BODY MASS INDEX: 27.68 KG/M2 | OXYGEN SATURATION: 94 %

## 2023-09-14 DIAGNOSIS — I48.0 PAROXYSMAL ATRIAL FIBRILLATION: ICD-10-CM

## 2023-09-14 DIAGNOSIS — I10 PRIMARY HYPERTENSION: ICD-10-CM

## 2023-09-14 DIAGNOSIS — I25.10 CORONARY ARTERY DISEASE DUE TO CALCIFIED CORONARY LESION: Primary | ICD-10-CM

## 2023-09-14 DIAGNOSIS — I25.84 CORONARY ARTERY DISEASE DUE TO CALCIFIED CORONARY LESION: Primary | ICD-10-CM

## 2023-09-14 PROCEDURE — 1159F MED LIST DOCD IN RCRD: CPT | Performed by: PHYSICIAN ASSISTANT

## 2023-09-14 PROCEDURE — 99213 OFFICE O/P EST LOW 20 MIN: CPT | Performed by: PHYSICIAN ASSISTANT

## 2023-09-14 PROCEDURE — 93000 ELECTROCARDIOGRAM COMPLETE: CPT | Performed by: PHYSICIAN ASSISTANT

## 2023-09-14 PROCEDURE — 1160F RVW MEDS BY RX/DR IN RCRD: CPT | Performed by: PHYSICIAN ASSISTANT

## 2023-09-14 RX ORDER — EMPAGLIFLOZIN 10 MG/1
10 TABLET, FILM COATED ORAL DAILY
COMMUNITY
Start: 2023-09-09

## 2023-09-14 RX ORDER — LEVOTHYROXINE SODIUM 0.05 MG/1
50 TABLET ORAL DAILY
COMMUNITY
Start: 2023-09-05

## 2023-09-14 NOTE — PROGRESS NOTES
Problem list     Subjective   Rukhsana Guerrero is a 89 y.o. female     Chief Complaint   Patient presents with    Follow-up   PROBLEM LIST:      1.  Coronary artery disease.  1.1.  Multivessel coronary artery disease per catheterization in 2006.  No targets for intervention was noted at that time and medical management was recommended.  She has been treated medically since.  1.2.  Repeat catheterization during hospitalization for non-ST elevation MI, 10/2022.  The patient again had diffuse coronary disease noted with no targets for intervention.  LAD was occluded mid vessel with no described collateral flow.  PDA was at 70% with otherwise noted moderate diffuse disease.  Medical management was recommended.  2.  Reported history of anterior wall MI.  3.  ICM, estimated EF at 40% by previous work-up.  3.1.  Echocardiogram during hospitalization, 10/2022, again supports evidence of systolic dysfunction but with an EF of 45±5%.  4.  Atrial fibrillation, on Eliquis  5.  Hypertension  6.  Dyslipidemia    HPI  The patient presents in clinic today for routine evaluation and follow-up.  Previous cardiovascular history is as outlined above.  Clinically, the patient actually has done fairly well since last evaluation.  She reports only rare chest pain.  Dyspnea and fatigue are at baseline.  A-fib is well controlled on amiodarone.  We did discuss dosing of Eliquis.  Given age and renal function, we have recommended 2.5 mg twice a day dosing which she confirms she is indeed taking.  Blood pressures typically are well controlled.  At this time she seems to feel fine and has no further complaints.    Current Outpatient Medications on File Prior to Visit   Medication Sig Dispense Refill    allopurinol (ZYLOPRIM) 300 MG tablet Take 1 tablet by mouth Daily.      amiodarone (PACERONE) 200 MG tablet Take 1 tablet by mouth Daily. 90 tablet 3    apixaban (ELIQUIS) 2.5 MG tablet tablet Take 1 tablet by mouth 2 (Two) Times a Day.       aspirin 81 MG EC tablet Take 1 tablet by mouth Daily.      atorvastatin (LIPITOR) 40 MG tablet Take 1 tablet by mouth Daily.      digoxin (LANOXIN) 125 MCG tablet TAKE 1/2 TAB EVERY OTHER DAY      furosemide (LASIX) 20 MG tablet Take 1 tablet by mouth Daily. 90 tablet 1    Jardiance 10 MG tablet tablet Take 1 tablet by mouth Daily.      levothyroxine (SYNTHROID, LEVOTHROID) 50 MCG tablet Take 1 tablet by mouth Daily.      metoprolol tartrate (LOPRESSOR) 25 MG tablet Take 1 tablet by mouth 2 (Two) Times a Day.      sacubitril-valsartan (ENTRESTO) 24-26 MG tablet Take 0.5 tablets by mouth 2 (Two) Times a Day. 56 tablet 0     No current facility-administered medications on file prior to visit.       Codeine    Past Medical History:   Diagnosis Date    Abnormal ECG     Atrial fibrillation     CHF (congestive heart failure)     Congenital heart disease     Coronary artery disease     COVID-19     COVID-19 vaccine administered     Diabetes mellitus     Gout     Heart valve disease     History of atrial fibrillation     Hyperlipidemia     Hypertension     Ischemic cardiomyopathy     Myocardial infarction     Pneumonia     Stroke        Social History     Socioeconomic History    Marital status:    Tobacco Use    Smoking status: Never    Smokeless tobacco: Never   Vaping Use    Vaping Use: Never used   Substance and Sexual Activity    Alcohol use: Never    Drug use: Never    Sexual activity: Not Currently     Birth control/protection: Post-menopausal       Family History   Problem Relation Age of Onset    Pancreatitis Mother     Heart disease Father     Heart attack Father        Review of Systems   Constitutional:  Positive for diaphoresis. Negative for chills, fatigue and fever.   HENT: Negative.     Eyes: Negative.  Negative for visual disturbance (wears glasses).   Respiratory: Negative.  Negative for apnea, cough, chest tightness, shortness of breath and wheezing.    Cardiovascular:  Positive for leg swelling  "(ble). Negative for chest pain and palpitations.   Gastrointestinal: Negative.  Negative for abdominal pain and blood in stool.   Endocrine: Positive for cold intolerance. Negative for heat intolerance.   Genitourinary: Negative.  Negative for hematuria.   Musculoskeletal:  Positive for arthralgias (hands/ knee/ shoulders) and back pain. Negative for myalgias, neck pain and neck stiffness.   Skin: Negative.  Negative for rash and wound.   Allergic/Immunologic: Positive for environmental allergies (seasonal). Negative for food allergies.   Neurological:  Positive for numbness (at night arms goes numb). Negative for dizziness, syncope, weakness, light-headedness and headaches.   Hematological:  Bruises/bleeds easily (bruises).   Psychiatric/Behavioral: Negative.  Negative for sleep disturbance.      Objective   Vitals:    09/14/23 1548   BP: 115/62   BP Location: Left arm   Patient Position: Sitting   Cuff Size: Adult   Pulse: 68   SpO2: 94%   Weight: 68.2 kg (150 lb 6.4 oz)   Height: 157.5 cm (62\")      /62 (BP Location: Left arm, Patient Position: Sitting, Cuff Size: Adult)   Pulse 68   Ht 157.5 cm (62\")   Wt 68.2 kg (150 lb 6.4 oz)   SpO2 94%   BMI 27.51 kg/m²    Lab Results (most recent)       None          Physical Exam  Vitals and nursing note reviewed.   Constitutional:       General: She is not in acute distress.     Appearance: She is well-developed.   HENT:      Head: Normocephalic and atraumatic.   Eyes:      Conjunctiva/sclera: Conjunctivae normal.      Pupils: Pupils are equal, round, and reactive to light.   Neck:      Vascular: No JVD.      Trachea: No tracheal deviation.   Cardiovascular:      Rate and Rhythm: Normal rate and regular rhythm.      Heart sounds: Normal heart sounds.      Comments: Soft systolic ejection murmur noted.  Minimal edema noted to bilateral lower extremities.  Pulmonary:      Effort: Pulmonary effort is normal.      Breath sounds: Normal breath sounds.   Abdominal: "      General: Bowel sounds are normal. There is no distension.      Palpations: Abdomen is soft. There is no mass.      Tenderness: There is no abdominal tenderness. There is no guarding or rebound.   Musculoskeletal:         General: No tenderness or deformity. Normal range of motion.      Cervical back: Normal range of motion and neck supple.   Skin:     General: Skin is warm and dry.      Coloration: Skin is not pale.      Findings: No erythema or rash.   Neurological:      Mental Status: She is alert and oriented to person, place, and time.   Psychiatric:         Behavior: Behavior normal.         Thought Content: Thought content normal.         Judgment: Judgment normal.         Procedure     ECG 12 Lead    Date/Time: 9/14/2023 3:56 PM  Performed by: Everett Salas PA  Authorized by: Everett Salas PA   Comparison: compared with previous ECG from 4/25/2023  Comparison to previous ECG: Sinus rhythm, right bundle branch block morphology, low voltage, a degree of artifact is noted, no acute changes are appreciated however.           Assessment & Plan      Diagnosis Plan   1. Coronary artery disease due to calcified coronary lesion        2. Paroxysmal atrial fibrillation        3. Primary hypertension          1.  At this time, the patient appears to be doing fairly well from general cardiovascular standpoint.  She denies symptoms of angina, failure, or dysrhythmias.  She tells me that she feels fairly well at this time from cardiovascular standpoint.    2.  Blood pressures are well controlled.  She will monitor that closely and call for complications.    3.  Eventually, I feel we need to consider alternate antidysrhythmic therapy other than amiodarone.  She feels fine for now and would like to continue medications.  She would be open to discussing this in the future.    4.  In that setting, we will make no adjustments in medications.  She will call immediately for issues.  We will continue to see the  patient on 6-month to 9-month intervals.           Advance Care Planning   ACP discussion was held with the patient during this visit. Patient does not have an advance directive, declines further assistance.                       Electronically signed by:

## 2023-12-04 ENCOUNTER — TELEPHONE (OUTPATIENT)
Dept: CARDIOLOGY | Facility: CLINIC | Age: 88
End: 2023-12-04
Payer: MEDICARE

## 2023-12-04 NOTE — TELEPHONE ENCOUNTER
Pt dropped off pt assistance paperwork for [employer]. I made pt aware that we will place it in the assistance's box for completion. I confirmed that financial documents were included with the assistance forms.       I placed the paperwork in Thu' box to review and complete for pt.   Please document when the forms are sent into the assistance program for pt, thank you!

## 2023-12-05 DIAGNOSIS — I25.5 ISCHEMIC CARDIOMYOPATHY: ICD-10-CM

## 2023-12-05 DIAGNOSIS — R06.02 SOB (SHORTNESS OF BREATH): ICD-10-CM

## 2023-12-07 DIAGNOSIS — R06.02 SOB (SHORTNESS OF BREATH): ICD-10-CM

## 2023-12-07 DIAGNOSIS — I25.5 ISCHEMIC CARDIOMYOPATHY: ICD-10-CM

## 2023-12-11 DIAGNOSIS — I25.5 ISCHEMIC CARDIOMYOPATHY: ICD-10-CM

## 2023-12-11 DIAGNOSIS — R06.02 SOB (SHORTNESS OF BREATH): ICD-10-CM

## 2024-02-05 DIAGNOSIS — I25.5 ISCHEMIC CARDIOMYOPATHY: ICD-10-CM

## 2024-02-05 DIAGNOSIS — R06.02 SOB (SHORTNESS OF BREATH): ICD-10-CM

## 2024-02-05 RX ORDER — FUROSEMIDE 20 MG/1
20 TABLET ORAL DAILY
Qty: 90 TABLET | Refills: 1 | Status: SHIPPED | OUTPATIENT
Start: 2024-02-05

## 2024-02-16 ENCOUNTER — TELEPHONE (OUTPATIENT)
Dept: CARDIOLOGY | Facility: CLINIC | Age: 89
End: 2024-02-16

## 2024-02-16 NOTE — TELEPHONE ENCOUNTER
The [facility] received a fax that requires your attention. The document has been indexed to the patient’s chart for your review.      Reason for sending: EXTERNAL MEDICAL RECORD NOTIFICATION     Documents Description: [id number]    Name of Sender: [facility]     Date Indexed: 2.14.24

## 2024-03-31 DIAGNOSIS — I48.91 ATRIAL FIBRILLATION, UNSPECIFIED TYPE: ICD-10-CM

## 2024-04-01 RX ORDER — AMIODARONE HYDROCHLORIDE 200 MG/1
200 TABLET ORAL DAILY
Qty: 90 TABLET | Refills: 3 | Status: SHIPPED | OUTPATIENT
Start: 2024-04-01

## 2024-05-14 ENCOUNTER — TELEPHONE (OUTPATIENT)
Dept: CARDIOLOGY | Facility: CLINIC | Age: 89
End: 2024-05-14
Payer: MEDICARE

## 2024-05-14 NOTE — TELEPHONE ENCOUNTER
Patient's daughter, Gwendolyn, left message that patient was not feeling well and would like to be seen sooner than next week with Dr Veronica. She reports BP and 02 stats not registering.     Relay...  Left message to return call with symptoms to see if patient needed to be seen sooner or proceed to ER. If patient is not feeling well in general, she may be need to contact PCP or quick care. At this time there are no sooner openings with any provider than what she is scheduled next week on 5/23.

## 2024-05-14 NOTE — TELEPHONE ENCOUNTER
Gwendolyn M stating that pt is at [facility] ER and that everything is coming back normal so far. Stated they will keep appt for Thursday

## 2024-05-23 ENCOUNTER — OFFICE VISIT (OUTPATIENT)
Dept: CARDIOLOGY | Facility: CLINIC | Age: 89
End: 2024-05-23
Payer: MEDICARE

## 2024-05-23 VITALS
HEIGHT: 62 IN | DIASTOLIC BLOOD PRESSURE: 50 MMHG | BODY MASS INDEX: 29.08 KG/M2 | SYSTOLIC BLOOD PRESSURE: 109 MMHG | OXYGEN SATURATION: 94 % | HEART RATE: 60 BPM | WEIGHT: 158 LBS

## 2024-05-23 DIAGNOSIS — I25.5 ISCHEMIC CARDIOMYOPATHY: ICD-10-CM

## 2024-05-23 DIAGNOSIS — I25.10 CORONARY ARTERY DISEASE INVOLVING NATIVE CORONARY ARTERY OF NATIVE HEART WITHOUT ANGINA PECTORIS: Primary | ICD-10-CM

## 2024-05-23 DIAGNOSIS — I48.0 PAROXYSMAL ATRIAL FIBRILLATION: ICD-10-CM

## 2024-05-23 PROCEDURE — 93000 ELECTROCARDIOGRAM COMPLETE: CPT | Performed by: PHYSICIAN ASSISTANT

## 2024-05-23 PROCEDURE — 1159F MED LIST DOCD IN RCRD: CPT | Performed by: PHYSICIAN ASSISTANT

## 2024-05-23 PROCEDURE — 99213 OFFICE O/P EST LOW 20 MIN: CPT | Performed by: PHYSICIAN ASSISTANT

## 2024-05-23 PROCEDURE — 1160F RVW MEDS BY RX/DR IN RCRD: CPT | Performed by: PHYSICIAN ASSISTANT

## 2024-05-23 RX ORDER — ALPRAZOLAM 0.25 MG/1
0.25 TABLET ORAL NIGHTLY PRN
COMMUNITY
Start: 2024-05-11

## 2024-05-23 NOTE — PROGRESS NOTES
Advance Care Planning   ACP discussion was held with the patient during this visit. Patient does not have an advance directive, declines further assistance.   Problem list     Subjective   Rukhsana Guerrero is a 89 y.o. female     Chief Complaint   Patient presents with   • Follow-up     8 month follow up - patient had fall approximately 3 months ago and c/o pain right rib area, also has hernia in that area as well. Patient was seen at [facility] last week for low heart rate and pain in right side. Records in chart.    • Shortness of Breath   PROBLEM LIST:      1.  Coronary artery disease.  1.1.  Multivessel coronary artery disease per catheterization in 2006.  No targets for intervention was noted at that time and medical management was recommended.  She has been treated medically since.  1.2.  Repeat catheterization during hospitalization for non-ST elevation MI, 10/2022.  The patient again had diffuse coronary disease noted with no targets for intervention.  LAD was occluded mid vessel with no described collateral flow.  PDA was at 70% with otherwise noted moderate diffuse disease.  Medical management was recommended.  2.  Reported history of anterior wall MI.  3.  ICM, estimated EF at 40% by previous work-up.  3.1.  Echocardiogram during hospitalization, 10/2022, again supports evidence of systolic dysfunction but with an EF of 45±5%.  4.  Atrial fibrillation, on Eliquis  5.  Hypertension  6.  Dyslipidemia    HPI  The patient presents to the clinic today for follow-up.  Cardiovascular history is as outlined above.  For the most part, the patient has done fairly well since last evaluation.  She does admit that she takes amiodarone and has done fairly well with regards to dysrhythmic issues/symptoms since being on that agent.  We did caution long-term side effects of amiodarone.  Eventually we will need to consider changing that completely.  She denies any significant chest pain.  Her dyspnea remains at baseline.  She has  no failure symptoms.  She has no further complaints.    Current Outpatient Medications on File Prior to Visit   Medication Sig Dispense Refill   • allopurinol (ZYLOPRIM) 300 MG tablet Take 1 tablet by mouth Daily.     • ALPRAZolam (XANAX) 0.25 MG tablet Take 1 tablet by mouth At Night As Needed.     • amiodarone (PACERONE) 200 MG tablet TAKE ONE TABLET BY MOUTH DAILY 90 tablet 3   • apixaban (ELIQUIS) 2.5 MG tablet tablet Take 1 tablet by mouth 2 (Two) Times a Day.     • aspirin 81 MG EC tablet Take 1 tablet by mouth Daily.     • atorvastatin (LIPITOR) 40 MG tablet Take 1 tablet by mouth Daily.     • furosemide (LASIX) 20 MG tablet TAKE 1 TABLET BY MOUTH DAILY 90 tablet 1   • Jardiance 10 MG tablet tablet Take 1 tablet by mouth Daily.     • levothyroxine (SYNTHROID, LEVOTHROID) 50 MCG tablet Take 1 tablet by mouth Daily. Takes 1/2 tablet daily     • metoprolol tartrate (LOPRESSOR) 25 MG tablet Take 1 tablet by mouth 2 (Two) Times a Day.     • sacubitril-valsartan (ENTRESTO) 24-26 MG tablet Take 0.5 tablets by mouth 2 (Two) Times a Day. 90 tablet 3     No current facility-administered medications on file prior to visit.       Codeine    Past Medical History:   Diagnosis Date   • Abnormal ECG    • Atrial fibrillation    • CHF (congestive heart failure)    • Chronic kidney disease     Had it for several years.   • Congenital heart disease    • Coronary artery disease    • COVID-19    • COVID-19 vaccine administered    • Diabetes mellitus    • Gout    • Heart valve disease    • History of atrial fibrillation    • Hyperlipidemia    • Hypertension    • Ischemic cardiomyopathy    • Myocardial infarction    • Pneumonia    • Stroke        Social History     Socioeconomic History   • Marital status:    Tobacco Use   • Smoking status: Never   • Smokeless tobacco: Never   Vaping Use   • Vaping status: Never Used   Substance and Sexual Activity   • Alcohol use: Never   • Drug use: Never   • Sexual activity: Not Currently  "    Birth control/protection: Post-menopausal       Family History   Problem Relation Age of Onset   • Pancreatitis Mother    • Heart disease Father    • Heart attack Father        Review of Systems   Constitutional:  Positive for diaphoresis. Negative for chills, fatigue and fever.   Eyes: Negative.  Negative for visual disturbance.   Respiratory:  Positive for cough, chest tightness, shortness of breath and wheezing. Negative for apnea.    Cardiovascular:  Positive for leg swelling. Negative for chest pain and palpitations.   Gastrointestinal: Negative.  Negative for abdominal pain and blood in stool.   Endocrine: Negative.    Genitourinary: Negative.  Negative for hematuria.   Musculoskeletal:  Positive for arthralgias, back pain, myalgias, neck pain and neck stiffness.   Skin: Negative.  Negative for rash and wound.   Allergic/Immunologic: Positive for environmental allergies (seasonal). Negative for food allergies.   Neurological:  Positive for numbness (hands). Negative for dizziness, syncope, weakness, light-headedness and headaches.   Hematological:  Bruises/bleeds easily (on eliquis and aspirin).   Psychiatric/Behavioral: Negative.  Negative for sleep disturbance.        Objective   Vitals:    05/23/24 1339   BP: 109/50   Pulse: 60   SpO2: 94%   Weight: 71.7 kg (158 lb)   Height: 157.5 cm (62\")      /50   Pulse 60   Ht 157.5 cm (62\")   Wt 71.7 kg (158 lb)   SpO2 94%   BMI 28.90 kg/m²    Lab Results (most recent)       None          Physical Exam  Vitals and nursing note reviewed.   Constitutional:       General: She is not in acute distress.     Appearance: She is well-developed.   HENT:      Head: Normocephalic and atraumatic.   Eyes:      Conjunctiva/sclera: Conjunctivae normal.      Pupils: Pupils are equal, round, and reactive to light.   Neck:      Vascular: No JVD.      Trachea: No tracheal deviation.   Cardiovascular:      Rate and Rhythm: Normal rate and regular rhythm.      Heart sounds: " Normal heart sounds.   Pulmonary:      Effort: Pulmonary effort is normal.      Breath sounds: Normal breath sounds.   Abdominal:      General: Bowel sounds are normal. There is no distension.      Palpations: Abdomen is soft. There is no mass.      Tenderness: There is no abdominal tenderness. There is no guarding or rebound.   Musculoskeletal:         General: No tenderness or deformity. Normal range of motion.      Cervical back: Normal range of motion and neck supple.   Skin:     General: Skin is warm and dry.      Coloration: Skin is not pale.      Findings: No erythema or rash.   Neurological:      Mental Status: She is alert and oriented to person, place, and time.   Psychiatric:         Behavior: Behavior normal.         Thought Content: Thought content normal.         Judgment: Judgment normal.         Procedure     ECG 12 Lead    Date/Time: 5/23/2024 2:35 PM  Performed by: Everett Salas PA    Authorized by: Everett Salas PA  Comparison: compared with previous ECG from 9/14/2023  Comparison to previous ECG: Sinus rhythm, rate 56, first-degree AV block, right bundle branch block, left anterior fascicular block, no acute changes noted.  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           Assessment & Plan      Diagnosis Plan   1. Coronary artery disease involving native coronary artery of native heart without angina pectoris        2. Paroxysmal atrial fibrillation        3. Ischemic cardiomyopathy          1.  The patient presents in for routine evaluation and follow-up.  History includes coronary artery disease, A-fib, and mild ischemic cardiomyopathy.    2.  For the most part, she appears mostly clinically stable.  She has no angina.  She denies symptoms of dysrhythmias at this time.  She appears well compensated with regards to mild cardiomyopathy.    3.  I did discuss consideration for updated noninvasive testing.  She would be open to that in the future.  She will call when ready to  pursue the same.    4.  For now, the patient appears to be on appropriate medical regimen.  I will continue the same without change.  I would be concerned about amiodarone long-term.  Eventually we will need to consider alternate dysrhythmic therapy, again with concern of long-term side effects with that agent.    5.  We will continue to monitor her closely.  She will call for any complications.  We will see her routinely through the clinic.                 Electronically signed by:

## 2024-09-30 DIAGNOSIS — R06.02 SOB (SHORTNESS OF BREATH): ICD-10-CM

## 2024-09-30 DIAGNOSIS — I25.5 ISCHEMIC CARDIOMYOPATHY: ICD-10-CM

## 2024-10-01 RX ORDER — FUROSEMIDE 20 MG
20 TABLET ORAL DAILY
Qty: 90 TABLET | Refills: 1 | Status: SHIPPED | OUTPATIENT
Start: 2024-10-01

## 2024-10-31 ENCOUNTER — TELEPHONE (OUTPATIENT)
Dept: CARDIOLOGY | Facility: CLINIC | Age: 89
End: 2024-10-31
Payer: MEDICARE

## 2024-10-31 NOTE — TELEPHONE ENCOUNTER
Per Everett Salas PA-C patient was to double up for 3 days then resume 20 mg dose. She can have extra sent to the pharmacy if needed.   Called patient, speaking to her daughter Guera. She stated Gwendolyn was the one who called and will give her the message.

## 2024-11-18 ENCOUNTER — TELEPHONE (OUTPATIENT)
Dept: CARDIOLOGY | Facility: CLINIC | Age: 89
End: 2024-11-18
Payer: MEDICARE

## 2024-11-18 DIAGNOSIS — I25.5 ISCHEMIC CARDIOMYOPATHY: ICD-10-CM

## 2024-11-18 DIAGNOSIS — R06.02 SOB (SHORTNESS OF BREATH): ICD-10-CM

## 2024-11-18 NOTE — TELEPHONE ENCOUNTER
Pt dropped off pt assistance paperwork for ENTRESTO. I made pt aware that we will place it in the assistance's box for completion.    I placed the paperwork in JEAN-CLAUDE' box to review and complete for pt.   Please document when the forms are sent into the assistance program for pt, thank you!

## 2024-11-18 NOTE — TELEPHONE ENCOUNTER
Patient's daughter dropped off patient assistance paperwork for entresto, note letter from social security states that she is eligible for assistance with her prescription's,     Called and spoke with daughter Guera informed her that patient qualifies for assistance with her prescription's from social security and she request rx be sent into pharmacy, rx submitted to pharmacy. Copy of letter placed in scan box to be scanned into patient's chart.

## 2024-12-26 RX ORDER — POTASSIUM CHLORIDE 750 MG/1
TABLET, EXTENDED RELEASE ORAL
Qty: 30 TABLET | Refills: 0 | Status: SHIPPED | OUTPATIENT
Start: 2024-12-26

## 2025-01-02 NOTE — TELEPHONE ENCOUNTER
Name from pharmacy: FUROSEMIDE 40 MG TABLET         Will file in chart as: furosemide (LASIX) 40 MG tablet    Sig: TAKE 1 TABLET BY MOUTH DAILY, MAY TAKE AN EXTRA 1/2 TABLET AS NEEDED FOR EXTRA EDEMA    Disp: 30 tablet    Refills: 1 (Pharmacy requested: Not specified)    Start: 1/2/2025    Class: Normal    Non-formulary    Last ordered: 1 month ago (12/2/2024) by TRISH Potter    Last refill: 12/19/2024    Rx #: [id number]    Diuretics Protocol Gagney5101/02/2025 02:18 PM   Protocol Details Normal serum potassium in past 12 months    Normal serum sodium in past 12 months    GFR> 30 ml/min in past year    No active pregnancy on record    No positive pregnancy test in past 12 months    Recent or future visit with authorizing provider    Blood pressure on record

## 2025-01-03 RX ORDER — FUROSEMIDE 40 MG/1
TABLET ORAL
Qty: 30 TABLET | Refills: 1 | Status: SHIPPED | OUTPATIENT
Start: 2025-01-03

## 2025-02-10 RX ORDER — FUROSEMIDE 40 MG/1
TABLET ORAL
Qty: 30 TABLET | Refills: 1 | Status: SHIPPED | OUTPATIENT
Start: 2025-02-10

## 2025-02-26 ENCOUNTER — OFFICE VISIT (OUTPATIENT)
Dept: CARDIOLOGY | Facility: CLINIC | Age: OVER 89
End: 2025-02-26
Payer: MEDICARE

## 2025-02-26 VITALS
HEIGHT: 62 IN | WEIGHT: 154.8 LBS | HEART RATE: 60 BPM | DIASTOLIC BLOOD PRESSURE: 56 MMHG | OXYGEN SATURATION: 95 % | BODY MASS INDEX: 28.49 KG/M2 | SYSTOLIC BLOOD PRESSURE: 107 MMHG

## 2025-02-26 DIAGNOSIS — R06.02 SHORTNESS OF BREATH: ICD-10-CM

## 2025-02-26 DIAGNOSIS — I48.19 PERSISTENT ATRIAL FIBRILLATION: ICD-10-CM

## 2025-02-26 DIAGNOSIS — I25.5 ISCHEMIC CARDIOMYOPATHY: ICD-10-CM

## 2025-02-26 DIAGNOSIS — I25.10 CORONARY ARTERY DISEASE DUE TO CALCIFIED CORONARY LESION: ICD-10-CM

## 2025-02-26 DIAGNOSIS — I10 ESSENTIAL HYPERTENSION: ICD-10-CM

## 2025-02-26 DIAGNOSIS — E78.2 MIXED HYPERLIPIDEMIA: Primary | ICD-10-CM

## 2025-02-26 DIAGNOSIS — I25.84 CORONARY ARTERY DISEASE DUE TO CALCIFIED CORONARY LESION: ICD-10-CM

## 2025-02-26 PROCEDURE — 99214 OFFICE O/P EST MOD 30 MIN: CPT | Performed by: INTERNAL MEDICINE

## 2025-02-26 PROCEDURE — 93000 ELECTROCARDIOGRAM COMPLETE: CPT | Performed by: INTERNAL MEDICINE

## 2025-02-26 RX ORDER — FUROSEMIDE 40 MG/1
40 TABLET ORAL DAILY
COMMUNITY

## 2025-02-26 NOTE — PROGRESS NOTES
Subjective   Rukhsana Guerrero is a 89 y.o. female     Chief Complaint   Patient presents with    Follow-up     Here for routine f/u    Coronary Artery Disease    Cardiomyopathy    Hyperlipidemia    Hypertension    Atrial Fibrillation       PROBLEM LIST:     1.  Coronary artery disease.  1.1.  Multivessel coronary artery disease per catheterization in 2006.  No targets for intervention was noted at that time and medical management was recommended.  She has been treated medically since.  1.2.  Repeat catheterization during hospitalization for non-ST elevation MI, 10/2022.  The patient again had diffuse coronary disease noted with no targets for intervention.  LAD was occluded mid vessel with no described collateral flow.  PDA was at 70% with otherwise noted moderate diffuse disease.  Medical management was recommended.  2.  Reported history of anterior wall MI.  3.  ICM, estimated EF at 40% by previous work-up.  3.1.  Echocardiogram during hospitalization, 10/2022, again supports evidence of systolic dysfunction but with an EF of 45±5%.  4.  Atrial fibrillation, on Eliquis  5.  Hypertension  6.  Dyslipidemia    Specialty Problems          Cardiology Problems    Coronary artery disease due to calcified coronary lesion        Essential hypertension        Ischemic cardiomyopathy        Mixed hyperlipidemia        Atrial fibrillation             HPI:    Ms. Guerrero returns for follow-up on the above.    She denies chest pain, orthopnea, or PND.  Lower extremity edema is mild to moderate and stable.  She senses no palpitations, and has no dizziness, presyncope or syncope.  She has had multiple falls in the past but none as of recent.    Because of persistent lower extremity edema Lasix was increased.  The patient also had cough at that time.  Increased Lasix was to be for a 3-day.  But the patient developed recurrent cough and shortness of breath and has stayed on that medication at 40 mg daily.  He have no recent labs but  creatinine from May of last year was 2.84.  Medications were reviewed.  Entresto is the only medication felt potentially contributing to renal dysfunction.  Last assessment of LV function was done at [facility] and was described at 50% entheses in 2022.    Ms. Guerrero denies any bleeding, she has no symptoms of TIA or stroke.  Blood pressures have been well-controlled.  She has had no intercurrent illnesses.                    PRIOR MEDICATIONS    Current Outpatient Medications on File Prior to Visit   Medication Sig Dispense Refill    allopurinol (ZYLOPRIM) 300 MG tablet Take 1 tablet by mouth Daily.      ALPRAZolam (XANAX) 0.25 MG tablet Take 1 tablet by mouth At Night As Needed.      amiodarone (PACERONE) 200 MG tablet TAKE ONE TABLET BY MOUTH DAILY 90 tablet 3    apixaban (ELIQUIS) 2.5 MG tablet tablet Take 1 tablet by mouth 2 (Two) Times a Day.      aspirin 81 MG EC tablet Take 1 tablet by mouth Daily.      atorvastatin (LIPITOR) 40 MG tablet Take 1 tablet by mouth Daily.      furosemide (LASIX) 40 MG tablet Take 1 tablet by mouth Daily.      Jardiance 10 MG tablet tablet Take 1 tablet by mouth Daily.      levothyroxine (SYNTHROID, LEVOTHROID) 50 MCG tablet Take 0.5 tablets by mouth Daily.      metoprolol tartrate (LOPRESSOR) 25 MG tablet Take 1 tablet by mouth 2 (Two) Times a Day.      potassium chloride 10 MEQ CR tablet TAKE 1 TABLET BY MOUTH DAILY. TO BE TAKEN WHEN YOU TAKE EXTRA LASIX (Patient taking differently: Take 1 tablet by mouth Daily.) 30 tablet 0    sacubitril-valsartan (ENTRESTO) 24-26 MG tablet Take 0.5 tablets by mouth 2 (Two) Times a Day. 90 tablet 3    [DISCONTINUED] furosemide (LASIX) 20 MG tablet TAKE 1 TABLET BY MOUTH DAILY 90 tablet 1    [DISCONTINUED] furosemide (LASIX) 40 MG tablet TAKE 1 TABLET BY MOUTH QD. MAY TAKE ADDITIONAL 1/2 TABLET AS NEEDED FOR EXTRA EDEMA 30 tablet 1     No current facility-administered medications on file prior to visit.        ALLERGIES:    Codeine    PAST MEDICAL HISTORY:    Past Medical History:   Diagnosis Date    Abnormal ECG     Atrial fibrillation     CHF (congestive heart failure)     Chronic kidney disease     Had it for several years.    Congenital heart disease     Coronary artery disease     COVID-19     COVID-19 vaccine administered     Diabetes mellitus     Gout     Heart valve disease     History of atrial fibrillation     Hyperlipidemia     Hypertension     Ischemic cardiomyopathy     Myocardial infarction     Pneumonia     Stroke        SURGICAL HISTORY:    Past Surgical History:   Procedure Laterality Date    APPENDECTOMY      CARDIAC CATHETERIZATION      GALLBLADDER SURGERY      HYSTERECTOMY      SHOULDER SURGERY         SOCIAL HISTORY:    Social History     Socioeconomic History    Marital status:    Tobacco Use    Smoking status: Never    Smokeless tobacco: Never   Vaping Use    Vaping status: Never Used   Substance and Sexual Activity    Alcohol use: Never    Drug use: Never    Sexual activity: Not Currently     Birth control/protection: Post-menopausal       FAMILY HISTORY:    Family History   Problem Relation Age of Onset    Pancreatitis Mother     Heart disease Father     Heart attack Father        Review of Systems   Constitutional:  Positive for fatigue.   HENT:  Positive for hearing loss.    Eyes:  Positive for visual disturbance (glasses).   Respiratory: Negative.     Cardiovascular:  Positive for leg swelling. Negative for chest pain and palpitations.   Gastrointestinal: Negative.    Endocrine: Negative.    Genitourinary: Negative.    Musculoskeletal:  Positive for arthralgias, gait problem (ambulates with rolling walker) and myalgias.   Skin: Negative.    Allergic/Immunologic: Negative.    Neurological:  Positive for numbness (left hand). Negative for dizziness, syncope and light-headedness.   Hematological:  Bruises/bleeds easily.   Psychiatric/Behavioral:  Positive for sleep disturbance.   "      VISIT VITALS:  Vitals:    02/26/25 0941   BP: 107/56   BP Location: Left arm   Patient Position: Sitting   Pulse: 60   SpO2: 95%   Weight: 70.2 kg (154 lb 12.8 oz)   Height: 157.5 cm (62.01\")      /56 (BP Location: Left arm, Patient Position: Sitting)   Pulse 60   Ht 157.5 cm (62.01\")   Wt 70.2 kg (154 lb 12.8 oz)   SpO2 95%   BMI 28.31 kg/m²     RECENT LABS:    Objective       Physical Exam  Vitals and nursing note reviewed.   Constitutional:       General: She is not in acute distress.     Appearance: She is well-developed.   HENT:      Head: Normocephalic and atraumatic.   Eyes:      Conjunctiva/sclera: Conjunctivae normal.      Pupils: Pupils are equal, round, and reactive to light.   Neck:      Vascular: Hepatojugular reflux (1 cm) and JVD (8 cm) present. No carotid bruit.      Trachea: No tracheal deviation.      Comments: Nl. Carotid upstrokes  Cardiovascular:      Rate and Rhythm: Normal rate and regular rhythm.      Pulses:           Radial pulses are 2+ on the right side and 2+ on the left side.      Heart sounds: Normal heart sounds. Heart sounds are distant (S1 & S2). No murmur heard.     No friction rub. S4 sounds present. No S3 sounds.   Pulmonary:      Effort: Pulmonary effort is normal.      Breath sounds: Examination of the right-lower field reveals rales. Examination of the left-lower field reveals rales. Rales present. No wheezing or rhonchi.      Comments: Nl. Expir. Phase  Nl. Breath sound intensity  Bronchial breath sounds on rt.     Abdominal:      General: Bowel sounds are normal. There is no distension or abdominal bruit.      Palpations: Abdomen is soft. There is no mass.      Tenderness: There is no abdominal tenderness. There is no guarding or rebound.      Comments: No organomegaly   Musculoskeletal:         General: No tenderness or deformity. Normal range of motion.      Cervical back: Normal range of motion and neck supple.      Right lower leg: Edema present.      " Left lower leg: Edema present.      Comments: BLE, 1+ edema, palpable pedal pulses     Skin:     General: Skin is warm and dry.      Coloration: Skin is not pale.      Findings: No erythema or rash.   Neurological:      Mental Status: She is alert and oriented to person, place, and time.   Psychiatric:         Behavior: Behavior normal.         Thought Content: Thought content normal.         Judgment: Judgment normal.           ECG 12 Lead    Date/Time: 2/26/2025 10:20 AM  Performed by: Simon Veronica MD    Authorized by: Simon Veronica MD  Comparison: compared with previous ECG from 5/23/2024            Assessment & Plan   #1.  Coronary artery disease.  The patient has no angina and most recent catheterization demonstrated no target for revascularization.  Will continue risk modification.    2.  Paroxysmal atrial fibrillation.  The patient is asymptomatic from the standpoint of palpitations and has no bleeding.  Eliquis dose is appropriate.  We will continue current medications.    3.  Stage IV to stage V renal insufficiency.  I would like to check an echocardiogram to reassess LV function.  If able we will try to stop Entresto and assess the effect of that invention and renal function.    4.  Ischemic dilated cardiomyopathy.  The patient is moderately well compensated clinically.  Although she has rales on exam today she is relatively asymptomatic from the standpoint of increased LV filling pressures.  We will continue to monitor.    5.  Lower extremity edema.  See numbers 3 and 4 above.  Rather than increasing diuretic therapy we will redouble efforts on sodium restriction, leg elevation, and compression hose.    6.  Ms. Guerrero will follow with Dr. Josue as instructed we will plan on seeing her in follow-up in 6 months or on an as needed basis as discussed.   Diagnosis Plan   1. Mixed hyperlipidemia        2. Ischemic cardiomyopathy        3. Essential hypertension        4. Coronary artery disease due to  calcified coronary lesion        5. Persistent atrial fibrillation        6. Shortness of breath            No follow-ups on file.         Rukhsana Guerrero  reports that she has never smoked. She has never used smokeless tobacco. I have educated her on the risk of diseases from using tobacco products such as cancer, COPD, and heart disease.       Advance Care Planning   ACP discussion was held with the patient during this visit. Patient does not have an advance directive, declines further assistance.            BMI is >= 25 and <30. (Overweight) The following options were offered after discussion;: pcp addressing               Electronically signed by:    Scribed for Simon Veronica MD by Antonette Duran LPN on February 26, 2025  at 09:48 EST    I, Simon Veronica MD personally performed the services described in this documentation as scribed by the above named individual in my presence, and it is both accurate and complete. February 26, 2025 09:48 EST      Dictated Utilizing Dragon Dictation: Part of this note may be an electronic transcription/translation of spoken language to printed text using the Dragon Dictation System.

## 2025-03-20 DIAGNOSIS — I48.91 ATRIAL FIBRILLATION, UNSPECIFIED TYPE: ICD-10-CM

## 2025-03-20 RX ORDER — FUROSEMIDE 40 MG/1
TABLET ORAL
Qty: 90 TABLET | Refills: 3 | Status: SHIPPED | OUTPATIENT
Start: 2025-03-20

## 2025-03-20 RX ORDER — AMIODARONE HYDROCHLORIDE 200 MG/1
200 TABLET ORAL DAILY
Qty: 90 TABLET | Refills: 3 | Status: SHIPPED | OUTPATIENT
Start: 2025-03-20